# Patient Record
Sex: MALE | Race: OTHER | HISPANIC OR LATINO | Employment: FULL TIME | URBAN - METROPOLITAN AREA
[De-identification: names, ages, dates, MRNs, and addresses within clinical notes are randomized per-mention and may not be internally consistent; named-entity substitution may affect disease eponyms.]

---

## 2018-01-15 ENCOUNTER — OFFICE VISIT CONVERTED (OUTPATIENT)
Dept: SURGERY | Facility: CLINIC | Age: 43
End: 2018-01-15
Attending: UROLOGY

## 2021-02-24 ENCOUNTER — HOSPITAL ENCOUNTER (OUTPATIENT)
Dept: PERIOP | Facility: HOSPITAL | Age: 46
Setting detail: HOSPITAL OUTPATIENT SURGERY
Discharge: HOME | End: 2021-02-24
Attending: SURGERY

## 2021-02-24 LAB
ALBUMIN SERPL-MCNC: 4.3 G/DL (ref 3.5–5)
ALBUMIN/GLOB SERPL: 1.2 {RATIO} (ref 1.4–2.6)
ALP SERPL-CCNC: 98 U/L (ref 53–128)
ALT SERPL-CCNC: 36 U/L (ref 10–40)
ANION GAP SERPL CALC-SCNC: 15 MMOL/L (ref 8–19)
APPEARANCE UR: CLEAR
AST SERPL-CCNC: 23 U/L (ref 15–50)
BASOPHILS # BLD AUTO: 0.06 10*3/UL (ref 0–0.2)
BASOPHILS NFR BLD AUTO: 0.8 % (ref 0–3)
BILIRUB SERPL-MCNC: 0.36 MG/DL (ref 0.2–1.3)
BILIRUB UR QL: NEGATIVE
BUN SERPL-MCNC: 17 MG/DL (ref 5–25)
BUN/CREAT SERPL: 14 {RATIO} (ref 6–20)
CALCIUM SERPL-MCNC: 9.5 MG/DL (ref 8.7–10.4)
CHLORIDE SERPL-SCNC: 102 MMOL/L (ref 99–111)
COLOR UR: YELLOW
CONV ABS IMM GRAN: 0.01 10*3/UL (ref 0–0.2)
CONV BACTERIA: NEGATIVE
CONV CO2: 25 MMOL/L (ref 22–32)
CONV COLLECTION SOURCE (UA): ABNORMAL
CONV HYALINE CASTS IN URINE MICRO: ABNORMAL /[LPF]
CONV IMMATURE GRAN: 0.1 % (ref 0–1.8)
CONV TOTAL PROTEIN: 7.8 G/DL (ref 6.3–8.2)
CONV UROBILINOGEN IN URINE BY AUTOMATED TEST STRIP: 1 {EHRLICHU}/DL (ref 0.1–1)
CREAT UR-MCNC: 1.19 MG/DL (ref 0.7–1.2)
DEPRECATED RDW RBC AUTO: 39.9 FL (ref 35.1–43.9)
EOSINOPHIL # BLD AUTO: 0.09 10*3/UL (ref 0–0.7)
EOSINOPHIL # BLD AUTO: 1.3 % (ref 0–7)
ERYTHROCYTE [DISTWIDTH] IN BLOOD BY AUTOMATED COUNT: 12.7 % (ref 11.6–14.4)
GFR SERPLBLD BASED ON 1.73 SQ M-ARVRAT: >60 ML/MIN/{1.73_M2}
GLOBULIN UR ELPH-MCNC: 3.5 G/DL (ref 2–3.5)
GLUCOSE SERPL-MCNC: 106 MG/DL (ref 70–99)
GLUCOSE UR QL: NEGATIVE MG/DL
HCT VFR BLD AUTO: 47.3 % (ref 42–52)
HGB BLD-MCNC: 15.8 G/DL (ref 14–18)
HGB UR QL STRIP: NEGATIVE
KETONES UR QL STRIP: NEGATIVE MG/DL
LEUKOCYTE ESTERASE UR QL STRIP: ABNORMAL
LIPASE SERPL-CCNC: 91 U/L (ref 5–51)
LYMPHOCYTES # BLD AUTO: 2.56 10*3/UL (ref 1–5)
LYMPHOCYTES NFR BLD AUTO: 35.6 % (ref 20–45)
MCH RBC QN AUTO: 28.8 PG (ref 27–31)
MCHC RBC AUTO-ENTMCNC: 33.4 G/DL (ref 33–37)
MCV RBC AUTO: 86.3 FL (ref 80–96)
MONOCYTES # BLD AUTO: 0.78 10*3/UL (ref 0.2–1.2)
MONOCYTES NFR BLD AUTO: 10.8 % (ref 3–10)
NEUTROPHILS # BLD AUTO: 3.7 10*3/UL (ref 2–8)
NEUTROPHILS NFR BLD AUTO: 51.4 % (ref 30–85)
NITRITE UR QL STRIP: NEGATIVE
NRBC CBCN: 0 % (ref 0–0.7)
OSMOLALITY SERPL CALC.SUM OF ELEC: 288 MOSM/KG (ref 273–304)
PH UR STRIP.AUTO: 6 [PH] (ref 5–8)
PLATELET # BLD AUTO: 246 10*3/UL (ref 130–400)
PMV BLD AUTO: 10.8 FL (ref 9.4–12.4)
POTASSIUM SERPL-SCNC: 3.9 MMOL/L (ref 3.5–5.3)
PROT UR QL: NEGATIVE MG/DL
RBC # BLD AUTO: 5.48 10*6/UL (ref 4.7–6.1)
RBC #/AREA URNS HPF: ABNORMAL /[HPF]
SODIUM SERPL-SCNC: 138 MMOL/L (ref 135–147)
SP GR UR: 1.02 (ref 1–1.03)
WBC # BLD AUTO: 7.2 10*3/UL (ref 4.8–10.8)
WBC #/AREA URNS HPF: ABNORMAL /[HPF]

## 2021-02-26 LAB — BACTERIA UR CULT: NORMAL

## 2021-03-22 ENCOUNTER — OFFICE VISIT CONVERTED (OUTPATIENT)
Dept: SURGERY | Facility: CLINIC | Age: 46
End: 2021-03-22
Attending: SURGERY

## 2021-05-14 VITALS — RESPIRATION RATE: 14 BRPM | WEIGHT: 200 LBS | BODY MASS INDEX: 33.32 KG/M2 | HEIGHT: 65 IN

## 2021-05-14 NOTE — PROGRESS NOTES
"   Progress Note      Patient Name: Isidoro Estrella   Patient ID: [id number]   Sex: Male   YOB: 1975    Primary Care Provider: No PCP No PCP Other    Visit Date: March 22, 2021    Provider: Rusty Correa MD   Location: [facility]   Location Address: [address] [place] [address]  [id number]   Location Phone: [phone]          Chief Complaint  · Follow up Surgery      History Of Present Illness  Isidoro Estrella is a 45 year old Other Race,  or  male who presents today for a postoperative visit.      Patient is here for follow-up after laparoscopic appendectomy.  He is doing fine.  No new issues.  Abdominal exam is benign.  Patient can see me as needed       Vitals  Date Time BP Position Site L\R Cuff Size HR RR TEMP (F) WT  HT  BMI kg/m2 BSA m2 O2 Sat FR L/min FiO2 HC       03/22/2021 01:46 PM       14  200lbs 0oz 5'  5\" 33.28 2.04                 Assessment  · Postoperative Exam Following Surgery     V67.00      Plan  · Medications  o Medications have been Reconciled  o Transition of Care or Provider Policy  · Instructions  o See Above HPI section.  o Electronically Identified Patient Education Materials Provided Electronically            Electronically Signed by: Rusty Correa MD -Author on March 22, 2021 01:49:55 PM  "

## 2021-05-16 VITALS
WEIGHT: 180 LBS | SYSTOLIC BLOOD PRESSURE: 122 MMHG | DIASTOLIC BLOOD PRESSURE: 82 MMHG | HEIGHT: 65 IN | BODY MASS INDEX: 29.99 KG/M2

## 2021-07-06 ENCOUNTER — OFFICE VISIT (OUTPATIENT)
Dept: FAMILY MEDICINE CLINIC | Facility: CLINIC | Age: 46
End: 2021-07-06

## 2021-07-06 ENCOUNTER — HOSPITAL ENCOUNTER (OUTPATIENT)
Dept: GENERAL RADIOLOGY | Facility: HOSPITAL | Age: 46
Discharge: HOME | End: 2021-07-06
Admitting: NURSE PRACTITIONER

## 2021-07-06 VITALS
BODY MASS INDEX: 34.02 KG/M2 | SYSTOLIC BLOOD PRESSURE: 132 MMHG | OXYGEN SATURATION: 95 % | WEIGHT: 204.2 LBS | HEART RATE: 75 BPM | HEIGHT: 65 IN | DIASTOLIC BLOOD PRESSURE: 90 MMHG | RESPIRATION RATE: 18 BRPM

## 2021-07-06 DIAGNOSIS — R03.0 ELEVATED BLOOD PRESSURE READING: ICD-10-CM

## 2021-07-06 DIAGNOSIS — R31.9 HEMATURIA, UNSPECIFIED TYPE: ICD-10-CM

## 2021-07-06 DIAGNOSIS — Z87.442 HISTORY OF KIDNEY STONES: ICD-10-CM

## 2021-07-06 DIAGNOSIS — M54.50 RIGHT-SIDED LOW BACK PAIN WITHOUT SCIATICA, UNSPECIFIED CHRONICITY: ICD-10-CM

## 2021-07-06 DIAGNOSIS — M54.50 ACUTE RIGHT-SIDED LOW BACK PAIN, UNSPECIFIED WHETHER SCIATICA PRESENT: Primary | ICD-10-CM

## 2021-07-06 LAB
BILIRUB BLD-MCNC: NEGATIVE MG/DL
CLARITY, POC: CLEAR
COLOR UR: YELLOW
GLUCOSE UR STRIP-MCNC: NEGATIVE MG/DL
KETONES UR QL: NEGATIVE
LEUKOCYTE EST, POC: NEGATIVE
NITRITE UR-MCNC: NEGATIVE MG/ML
PH UR: 5.5 [PH] (ref 5–8)
PROT UR STRIP-MCNC: NEGATIVE MG/DL
RBC # UR STRIP: ABNORMAL /UL
SP GR UR: 1.03 (ref 1–1.03)
UROBILINOGEN UR QL: NORMAL

## 2021-07-06 PROCEDURE — 74018 RADEX ABDOMEN 1 VIEW: CPT

## 2021-07-06 PROCEDURE — 81003 URINALYSIS AUTO W/O SCOPE: CPT | Performed by: NURSE PRACTITIONER

## 2021-07-06 PROCEDURE — 99213 OFFICE O/P EST LOW 20 MIN: CPT | Performed by: NURSE PRACTITIONER

## 2021-07-06 PROCEDURE — 74018 RADEX ABDOMEN 1 VIEW: CPT | Performed by: RADIOLOGY

## 2021-07-06 PROCEDURE — 87086 URINE CULTURE/COLONY COUNT: CPT | Performed by: NURSE PRACTITIONER

## 2021-07-06 NOTE — PROGRESS NOTES
"Chief Complaint  Establish Care, right side low back pain    Subjective          Isidoro Estrella presents to [facility] for   History of Present Illness    Patient is here to establish care. Patient does not have a previous PCP.    Patient has some complaints of a possible kidney stone, patient is having lower back pain that does not radiate. Patient denies any blood in urine or dysuria. states this has been going on for approx 1 year.  States has worsened , any movement the wrong way causes sharp stabbing pain in the location.     Pt has hx of kidney stones, has had to have surgery 2 separate times. States last time was in 2019.     Patient would like info on getting a vasectomy. Pt has children, youngest is 14 years. States he feels like he is definitely done with having kids, states he Is .     Medical History  Past Medical History:   Diagnosis Date   • Allergic    • Gallstones    • Kidney stone      Surgical History  Past Surgical History:   Procedure Laterality Date   • APPENDECTOMY     • KIDNEY STONE SURGERY       Social History  Social History     Socioeconomic History   • Marital status:      Spouse name: Not on file   • Number of children: Not on file   • Years of education: Not on file   • Highest education level: Not on file   Tobacco Use   • Smoking status: Never Smoker   • Smokeless tobacco: Never Used   Vaping Use   • Vaping Use: Never used   Substance and Sexual Activity   • Alcohol use: Never   • Drug use: Never   • Sexual activity: Defer     No current outpatient medications on file.    Review of Systems     Objective     /90 (BP Location: Left arm, Patient Position: Sitting, Cuff Size: Adult)   Pulse 75   Resp 18   Ht 165.1 cm (65\")   Wt 92.6 kg (204 lb 3.2 oz)   SpO2 95%   BMI 33.98 kg/m²     Body mass index is 33.98 kg/m².    Physical Exam  Vitals reviewed.   Constitutional:       Appearance: Normal appearance. He is well-developed. "   HENT:      Head: Normocephalic and atraumatic.      Right Ear: External ear normal.      Left Ear: External ear normal.      Mouth/Throat:      Pharynx: No oropharyngeal exudate.   Eyes:      Conjunctiva/sclera: Conjunctivae normal.      Pupils: Pupils are equal, round, and reactive to light.   Cardiovascular:      Rate and Rhythm: Normal rate and regular rhythm.      Heart sounds: No murmur heard.   No friction rub. No gallop.    Pulmonary:      Effort: Pulmonary effort is normal.      Breath sounds: Normal breath sounds. No wheezing or rhonchi.   Abdominal:      Palpations: Abdomen is soft.      Tenderness: There is no abdominal tenderness. There is no right CVA tenderness or left CVA tenderness.   Musculoskeletal:      Lumbar back: Tenderness present. No deformity or bony tenderness. Decreased range of motion. Positive right straight leg raise test. Negative left straight leg raise test.      Comments: Very mildly positive right-sided straight leg raise, patient tender to palpation right side low back.Patient unable to bend over due to pain.   Skin:     General: Skin is warm and dry.   Neurological:      Mental Status: He is alert and oriented to person, place, and time.      Cranial Nerves: No cranial nerve deficit.   Psychiatric:         Mood and Affect: Mood and affect normal.         Behavior: Behavior normal.         Thought Content: Thought content normal.         Judgment: Judgment normal.         Result Review :     The following data was reviewed by: TEJ Solitario on 07/06/2021:    UA    Urinalysis 2/24/21 7/6/21   Specific [place], UA 1.021    Ketones, UA NEGATIVE Negative   Blood, UA NEGATIVE    Leukocytes, UA MODERATE (A) Negative   Nitrite, UA NEGATIVE    RBC, UA NONE SEEN    (A) Abnormal value       Comments are available for some flowsheets but are not being displayed.           UA today positive for Trace BLOOD                      Assessment:  Diagnoses and all orders for this  visit:    1. Acute right-sided low back pain, unspecified whether sciatica present (Primary)  -     POCT urinalysis dipstick, automated    2. Right-sided low back pain without sciatica, unspecified chronicity  -     XR Abdomen KUB; Future    3. Hematuria, unspecified type  -     XR Abdomen KUB; Future    4. History of kidney stones  -     XR Abdomen KUB; Future    5. Elevated blood pressure reading        Plan: Patient to monitor blood pressure and follow-up if above 130/80.  Will obtain KUB today and expect to possibly order CT abdomen and pelvis depending on results.  Will make referral to urology for both vasectomy discussion and kidney stone if one is noted.    Follow Up     No follow-ups on file.    Patient was given instructions and counseling regarding his condition or for health maintenance advice. Please see specific information pulled into the AVS if appropriate.     Christy Guerrero, APRN  07/06/2021

## 2021-07-07 ENCOUNTER — TELEPHONE (OUTPATIENT)
Dept: FAMILY MEDICINE CLINIC | Facility: CLINIC | Age: 46
End: 2021-07-07

## 2021-07-07 DIAGNOSIS — R31.9 HEMATURIA, UNSPECIFIED TYPE: Primary | ICD-10-CM

## 2021-07-07 LAB — BACTERIA SPEC AEROBE CULT: NO GROWTH

## 2021-07-16 ENCOUNTER — HOSPITAL ENCOUNTER (OUTPATIENT)
Dept: CT IMAGING | Facility: HOSPITAL | Age: 46
Discharge: HOME | End: 2021-07-16
Admitting: NURSE PRACTITIONER

## 2021-07-16 DIAGNOSIS — R31.9 HEMATURIA, UNSPECIFIED TYPE: ICD-10-CM

## 2021-07-16 PROCEDURE — 74176 CT ABD & PELVIS W/O CONTRAST: CPT

## 2021-07-20 ENCOUNTER — TELEPHONE (OUTPATIENT)
Dept: FAMILY MEDICINE CLINIC | Facility: CLINIC | Age: 46
End: 2021-07-20

## 2021-07-20 DIAGNOSIS — N17.2: Primary | ICD-10-CM

## 2022-05-16 ENCOUNTER — TELEPHONE (OUTPATIENT)
Dept: INTERNAL MEDICINE | Facility: CLINIC | Age: 47
End: 2022-05-16

## 2022-05-16 NOTE — TELEPHONE ENCOUNTER
Caller: Isidoro Estrella    Relationship to patient: Self    Best call back number: [phone]    Additional notes: PATIENT NEEDS PAPER PROOF FOR HIS JOB THAT HE HAS AN APPOINTMENT SCHEDULED ON 6/27/22 WITH DR CARTER. PLEASE CALL PATIENT TO LET HIM KNOW IF SOMETHING CAN BE PRINTED OUT.

## 2022-06-24 NOTE — PATIENT INSTRUCTIONS
Cooking With Less Salt  Cooking with less salt is one way to reduce the amount of sodium you get from food. Sodium is one of the elements that make up salt. It is found naturally in foods and is also added to certain foods. Depending on your condition and overall health, your health care provider or dietitian may recommend that you reduce your sodium intake. Most people should have less than 2,300 milligrams (mg) of sodium each day. If you have high blood pressure (hypertension), you may need to limit your sodium to 1,500 mg each day. Follow the tips below to help reduce your sodium intake.  What are tips for eating less sodium?  Reading food labels       Check the food label before buying or using packaged ingredients. Always check the label for the serving size and sodium content.  Look for products with no more than 140 mg of sodium in one serving.  Check the % Daily Value column to see what percent of the daily recommended amount of sodium is provided in one serving of the product. Foods with 5% or less in this column are considered low in sodium. Foods with 20% or higher are considered high in sodium.  Do not choose foods with salt as one of the first three ingredients on the ingredients list. If salt is one of the first three ingredients, it usually means the item is high in sodium.     Shopping  Buy sodium-free or low-sodium products. Look for the following words on food labels:  Low-sodium.  Sodium-free.  Reduced-sodium.  No salt added.  Unsalted.  Always check the sodium content even if foods are labeled as low-sodium or no salt added.  Buy fresh foods.  Cooking  Use herbs, seasonings without salt, and spices as substitutes for salt.  Use sodium-free baking soda when baking.  Cloquet, braise, or roast foods to add flavor with less salt.  Avoid adding salt to pasta, rice, or hot cereals.  Drain and rinse canned vegetables, beans, and meat before use.  Avoid adding salt when cooking sweets and desserts.  Cook  "with low-sodium ingredients.  What foods are high in sodium?  Vegetables  Regular canned vegetables (not low-sodium or reduced-sodium). Sauerkraut, pickled vegetables, and relishes. Olives. French fries. Onion rings. Regular canned tomato sauce and paste. Regular tomato and vegetable juice. Frozen vegetables in sauces.  Grains  Instant hot cereals. Bread stuffing, pancake, and biscuit mixes. Croutons. Seasoned rice or pasta mixes. Noodle soup cups. Boxed or frozen macaroni and cheese. Regular salted crackers. Self-rising flour. Rolls. Bagels. Flour tortillas and wraps.  Meats and other proteins  Meat or fish that is salted, canned, smoked, cured, spiced, or pickled. This includes camacho, ham, sausages, hot dogs, corned beef, chipped beef, meat loaves, salt pork, jerky, pickled herring, anchovies, regular canned tuna, and sardines. Salted nuts.  Dairy  Processed cheese and cheese spreads. Cheese curds. Blue cheese. Feta cheese. String cheese. Regular cottage cheese. Buttermilk. Canned milk.  The items listed above may not be a complete list of foods high in sodium. Actual amounts of sodium may be different depending on processing. Contact a dietitian for more information.  What foods are low in sodium?  Fruits  Fresh, frozen, or canned fruit with no sauce added. Fruit juice.  Vegetables  Fresh or frozen vegetables with no sauce added. \"No salt added\" canned vegetables. \"No salt added\" tomato sauce and paste. Low-sodium or reduced-sodium tomato and vegetable juice.  Grains  Noodles, pasta, quinoa, rice. Shredded or puffed wheat or puffed rice. Regular or quick oats (not instant). Low-sodium crackers. Low-sodium bread. Whole-grain bread and whole-grain pasta. Unsalted popcorn.  Meats and other proteins  Fresh or frozen whole meats, poultry (not injected with sodium), and fish with no sauce added. Unsalted nuts. Dried peas, beans, and lentils without added salt. Unsalted canned beans. Eggs. Unsalted nut butters. " Low-sodium canned tuna or chicken.  Dairy  Milk. Soy milk. Yogurt. Low-sodium cheeses, such as Swiss, [place], mozzarella, and ricotta. Sherbet or ice cream (keep to ½ cup per serving). Cream cheese.  Fats and oils  Unsalted butter or margarine.  Other foods  Homemade pudding. Sodium-free baking soda and baking powder. Herbs and spices. Low-sodium seasoning mixes.  Beverages  Coffee and tea. Carbonated beverages.  The items listed above may not be a complete list of foods low in sodium. Actual amounts of sodium may be different depending on processing. Contact a dietitian for more information.  What are some salt alternatives when cooking?  The following are herbs, seasonings, and spices that can be used instead of salt to flavor your food. Herbs should be fresh or dried. Do not choose packaged mixes. Next to the name of the herb, spice, or seasoning are some examples of foods you can pair it with.  Herbs  Bay leaves - Soups, meat and vegetable dishes, and spaghetti sauce.  Basil - Italian dishes, soups, pasta, and fish dishes.  Cilantro - Meat, poultry, and vegetable dishes.  Chili powder - Marinades and Mexican dishes.  Chives - Salad dressings and potato dishes.  Cumin - Mexican dishes, couscous, and meat dishes.  Dill - Fish dishes, sauces, and salads.  Fennel - Meat and vegetable dishes, breads, and cookies.  Garlic (do not use garlic salt) - Italian dishes, meat dishes, salad dressings, and sauces.  Marjoram - Soups, potato dishes, and meat dishes.  Oregano - Pizza and spaghetti sauce.  Parsley - Salads, soups, pasta, and meat dishes.  Carina - Italian dishes, salad dressings, soups, and red meats.  Saffron - Fish dishes, pasta, and some poultry dishes.  Branden - Stuffings and sauces.  Tarragon - Fish and poultry dishes.  Thyme - Stuffing, meat, and fish dishes.  Seasonings  Lemon juice - Fish dishes, poultry dishes, vegetables, and salads.  Vinegar - Salad dressings, vegetables, and fish  "dishes.  Spices  Cinnamon - Sweet dishes, such as cakes, cookies, and puddings.  Cloves - Gingerbread, puddings, and marinades for meats.  Coelho - Vegetable dishes, fish and poultry dishes, and stir-miramontes dishes.  Joyce - Vegetable dishes, fish dishes, and stir-miramontes dishes.  Nutmeg - Pasta, vegetables, poultry, fish dishes, and custard.  Summary  Cooking with less salt is one way to reduce the amount of sodium that you get from food.  Buy sodium-free or low-sodium products.  Check the food label before using or buying packaged ingredients.  Use herbs, seasonings without salt, and spices as substitutes for salt in foods.  This information is not intended to replace advice given to you by your health care provider. Make sure you discuss any questions you have with your health care provider.  Document Revised: 12/09/2020 Document Reviewed: 12/09/2020  [employer] Patient Education © 2021 [employer]      https://www.nhlbi.nih.gov/files/docs/public/heart/dash_brief.pdf\">   DASH Eating Plan  DASH stands for Dietary Approaches to Stop Hypertension. The DASH eating plan is a healthy eating plan that has been shown to:  Reduce high blood pressure (hypertension).  Reduce your risk for type 2 diabetes, heart disease, and stroke.  Help with weight loss.  What are tips for following this plan?  Reading food labels  Check food labels for the amount of salt (sodium) per serving. Choose foods with less than 5 percent of the Daily Value of sodium. Generally, foods with less than 300 milligrams (mg) of sodium per serving fit into this eating plan.  To find whole grains, look for the word \"whole\" as the first word in the ingredient list.  Shopping  Buy products labeled as \"low-sodium\" or \"no salt added.\"  Buy fresh foods. Avoid canned foods and pre-made or frozen meals.  Cooking  Avoid adding salt when cooking. Use salt-free seasonings or herbs instead of table salt or sea salt. Check with your health care provider or pharmacist before " using salt substitutes.  Do not miramontes foods. Cook foods using healthy methods such as baking, boiling, grilling, roasting, and broiling instead.  Cook with heart-healthy oils, such as olive, canola, avocado, soybean, or sunflower oil.  Meal planning       Eat a balanced diet that includes:  4 or more servings of fruits and 4 or more servings of vegetables each day. Try to fill one-half of your plate with fruits and vegetables.  6-8 servings of whole grains each day.  Less than 6 oz (170 g) of lean meat, poultry, or fish each day. A 3-oz (85-g) serving of meat is about the same size as a deck of cards. One egg equals 1 oz (28 g).  2-3 servings of low-fat dairy each day. One serving is 1 cup (237 mL).  1 serving of nuts, seeds, or beans 5 times each week.  2-3 servings of heart-healthy fats. Healthy fats called omega-3 fatty acids are found in foods such as walnuts, flaxseeds, fortified milks, and eggs. These fats are also found in cold-water fish, such as sardines, salmon, and mackerel.  Limit how much you eat of:  Canned or prepackaged foods.  Food that is high in trans fat, such as some fried foods.  Food that is high in saturated fat, such as fatty meat.  Desserts and other sweets, sugary drinks, and other foods with added sugar.  Full-fat dairy products.  Do not salt foods before eating.  Do not eat more than 4 egg yolks a week.  Try to eat at least 2 vegetarian meals a week.  Eat more home-cooked food and less restaurant, buffet, and fast food.     Lifestyle  When eating at a restaurant, ask that your food be prepared with less salt or no salt, if possible.  If you drink alcohol:  Limit how much you use to:  0-1 drink a day for women who are not pregnant.  0-2 drinks a day for men.  Be aware of how much alcohol is in your drink. In the [place] one drink equals one 12 oz bottle of beer (355 mL), one 5 oz glass of wine (148 mL), or one 1½ oz glass of hard liquor (44 mL).  General information  Avoid eating more than  2,300 mg of salt a day. If you have hypertension, you may need to reduce your sodium intake to 1,500 mg a day.  Work with your health care provider to maintain a healthy body weight or to lose weight. Ask what an ideal weight is for you.  Get at least 30 minutes of exercise that causes your heart to beat faster (aerobic exercise) most days of the week. Activities may include walking, swimming, or biking.  Work with your health care provider or dietitian to adjust your eating plan to your individual calorie needs.  What foods should I eat?  Fruits  All fresh, dried, or frozen fruit. Canned fruit in natural juice (without added sugar).  Vegetables  Fresh or frozen vegetables (raw, steamed, roasted, or grilled). Low-sodium or reduced-sodium tomato and vegetable juice. Low-sodium or reduced-sodium tomato sauce and tomato paste. Low-sodium or reduced-sodium canned vegetables.  Grains  Whole-grain or whole-wheat bread. Whole-grain or whole-wheat pasta. Brown rice. Oatmeal. Quinoa. Bulgur. Whole-grain and low-sodium cereals. Malka bread. Low-fat, low-sodium crackers. Whole-wheat flour tortillas.  Meats and other proteins  Skinless chicken or turkey. Ground chicken or turkey. Pork with fat trimmed off. Fish and seafood. Egg whites. Dried beans, peas, or lentils. Unsalted nuts, nut butters, and seeds. Unsalted canned beans. Lean cuts of beef with fat trimmed off. Low-sodium, lean precooked or cured meat, such as sausages or meat loaves.  Dairy  Low-fat (1%) or fat-free (skim) milk. Reduced-fat, low-fat, or fat-free cheeses. Nonfat, low-sodium ricotta or cottage cheese. Low-fat or nonfat yogurt. Low-fat, low-sodium cheese.  Fats and oils  Soft margarine without trans fats. Vegetable oil. Reduced-fat, low-fat, or light mayonnaise and salad dressings (reduced-sodium). Canola, safflower, olive, avocado, soybean, and sunflower oils. Avocado.  Seasonings and condiments  Herbs. Spices. Seasoning mixes without salt.  Other  foods  Unsalted popcorn and pretzels. Fat-free sweets.  The items listed above may not be a complete list of foods and beverages you can eat. Contact a dietitian for more information.  What foods should I avoid?  Fruits  Canned fruit in a light or heavy syrup. Fried fruit. Fruit in cream or butter sauce.  Vegetables  Creamed or fried vegetables. Vegetables in a cheese sauce. Regular canned vegetables (not low-sodium or reduced-sodium). Regular canned tomato sauce and paste (not low-sodium or reduced-sodium). Regular tomato and vegetable juice (not low-sodium or reduced-sodium). Pickles. Olives.  Grains  Baked goods made with fat, such as croissants, muffins, or some breads. Dry pasta or rice meal packs.  Meats and other proteins  Fatty cuts of meat. Ribs. Fried meat. Dejesus. Bologna, salami, and other precooked or cured meats, such as sausages or meat loaves. Fat from the back of a pig (fatback). Bratwurst. Salted nuts and seeds. Canned beans with added salt. Canned or smoked fish. Whole eggs or egg yolks. Chicken or turkey with skin.  Dairy  Whole or 2% milk, cream, and half-and-half. Whole or full-fat cream cheese. Whole-fat or sweetened yogurt. Full-fat cheese. Nondairy creamers. Whipped toppings. Processed cheese and cheese spreads.  Fats and oils  Butter. Stick margarine. Lard. Shortening. Ghee. Dejesus fat. Tropical oils, such as coconut, palm kernel, or palm oil.  Seasonings and condiments  Onion salt, garlic salt, seasoned salt, table salt, and sea salt. Worcestershire sauce. Tartar sauce. Barbecue sauce. Teriyaki sauce. Soy sauce, including reduced-sodium. Steak sauce. Canned and packaged gravies. Fish sauce. Oyster sauce. Cocktail sauce. Store-bought horseradish. Ketchup. Mustard. Meat flavorings and tenderizers. Bouillon cubes. Hot sauces. Pre-made or packaged marinades. Pre-made or packaged taco seasonings. Relishes. Regular salad dressings.  Other foods  Salted popcorn and pretzels.  The items listed above  may not be a complete list of foods and beverages you should avoid. Contact a dietitian for more information.  Where to find more information  National Heart, Lung, and Blood [place]: www.nhlbi.nih.gov  American Heart Association: www.heart.org  Academy of Nutrition and Dietetics: www.eatright.org  National Kidney Foundation: www.kidney.org  Summary  The DASH eating plan is a healthy eating plan that has been shown to reduce high blood pressure (hypertension). It may also reduce your risk for type 2 diabetes, heart disease, and stroke.  When on the DASH eating plan, aim to eat more fresh fruits and vegetables, whole grains, lean proteins, low-fat dairy, and heart-healthy fats.  With the DASH eating plan, you should limit salt (sodium) intake to 2,300 mg a day. If you have hypertension, you may need to reduce your sodium intake to 1,500 mg a day.  Work with your health care provider or dietitian to adjust your eating plan to your individual calorie needs.  This information is not intended to replace advice given to you by your health care provider. Make sure you discuss any questions you have with your health care provider.  Document Revised: 11/20/2020 Document Reviewed: 11/20/2020  [employer] Patient Education © 2021 [employer].       Heart-Healthy Eating Plan  Heart-healthy meal planning includes:  Eating less unhealthy fats.  Eating more healthy fats.  Making other changes in your diet.  Talk with your doctor or a diet specialist (dietitian) to create an eating plan that is right for you.  What is my plan?  Your doctor may recommend an eating plan that includes:  Total fat: ______% or less of total calories a day.  Saturated fat: ______% or less of total calories a day.  Cholesterol: less than _________mg a day.  What are tips for following this plan?  Cooking  Avoid frying your food. Try to bake, boil, grill, or broil it instead. You can also reduce fat by:  Removing the skin from poultry.  Removing all  visible fats from meats.  Steaming vegetables in water or broth.  Meal planning       At meals, divide your plate into four equal parts:  Fill one-half of your plate with vegetables and green salads.  Fill one-fourth of your plate with whole grains.  Fill one-fourth of your plate with lean protein foods.  Eat 4-5 servings of vegetables per day. A serving of vegetables is:  1 cup of raw or cooked vegetables.  2 cups of raw leafy greens.  Eat 4-5 servings of fruit per day. A serving of fruit is:  1 medium whole fruit.  ¼ cup of dried fruit.  ½ cup of fresh, frozen, or canned fruit.  ½ cup of 100% fruit juice.  Eat more foods that have soluble fiber. These are apples, broccoli, carrots, beans, peas, and barley. Try to get 20-30 g of fiber per day.  Eat 4-5 servings of nuts, legumes, and seeds per week:  1 serving of dried beans or legumes equals ½ cup after being cooked.  1 serving of nuts is ¼ cup.  1 serving of seeds equals 1 tablespoon.     General information  Eat more home-cooked food. Eat less restaurant, buffet, and fast food.  Limit or avoid alcohol.  Limit foods that are high in starch and sugar.  Avoid fried foods.  Lose weight if you are overweight.  Keep track of how much salt (sodium) you eat. This is important if you have high blood pressure. Ask your doctor to tell you more about this.  Try to add vegetarian meals each week.  Fats  Choose healthy fats. These include olive oil and canola oil, flaxseeds, walnuts, almonds, and seeds.  Eat more omega-3 fats. These include salmon, mackerel, sardines, tuna, flaxseed oil, and ground flaxseeds. Try to eat fish at least 2 times each week.  Check food labels. Avoid foods with trans fats or high amounts of saturated fat.  Limit saturated fats.  These are often found in animal products, such as meats, butter, and cream.  These are also found in plant foods, such as palm oil, palm kernel oil, and coconut oil.  Avoid foods with partially hydrogenated oils in them.  These have trans fats. Examples are stick margarine, some tub margarines, cookies, crackers, and other baked goods.  What foods can I eat?  Fruits  All fresh, canned (in natural juice), or frozen fruits.  Vegetables  Fresh or frozen vegetables (raw, steamed, roasted, or grilled). Green salads.  Grains  Most grains. Choose whole wheat and whole grains most of the time. Rice and pasta, including brown rice and pastas made with whole wheat.  Meats and other proteins  Lean, well-trimmed beef, veal, pork, and lamb. Chicken and turkey without skin. All fish and shellfish. Wild duck, rabbit, pheasant, and venison. Egg whites or low-cholesterol egg substitutes. Dried beans, peas, lentils, and tofu. Seeds and most nuts.  Dairy  Low-fat or nonfat cheeses, including ricotta and mozzarella. Skim or 1% milk that is liquid, powdered, or evaporated. Buttermilk that is made with low-fat milk. Nonfat or low-fat yogurt.  Fats and oils  Non-hydrogenated (trans-free) margarines. Vegetable oils, including soybean, sesame, sunflower, olive, peanut, safflower, corn, canola, and cottonseed. Salad dressings or mayonnaise made with a vegetable oil.  Beverages  Mineral water. Coffee and tea. Diet carbonated beverages.  Sweets and desserts  Sherbet, gelatin, and fruit ice. Small amounts of dark chocolate.  Limit all sweets and desserts.  Seasonings and condiments  All seasonings and condiments.  The items listed above may not be a complete list of foods and drinks you can eat. Contact a dietitian for more options.  What foods should I avoid?  Fruits  Canned fruit in heavy syrup. Fruit in cream or butter sauce. Fried fruit. Limit coconut.  Vegetables  Vegetables cooked in cheese, cream, or butter sauce. Fried vegetables.  Grains  Breads that are made with saturated or trans fats, oils, or whole milk. Croissants. Sweet rolls. Donuts. High-fat crackers, such as cheese crackers.  Meats and other proteins  Fatty meats, such as hot dogs, ribs,  sausage, camacho, rib-eye roast or steak. High-fat deli meats, such as salami and bologna. Caviar. Domestic duck and goose. Organ meats, such as liver.  Dairy  Cream, sour cream, cream cheese, and creamed cottage cheese. Whole-milk cheeses. Whole or 2% milk that is liquid, evaporated, or condensed. Whole buttermilk. Cream sauce or high-fat cheese sauce. Yogurt that is made from whole milk.  Fats and oils  Meat fat, or shortening. Cocoa butter, hydrogenated oils, palm oil, coconut oil, palm kernel oil. Solid fats and shortenings, including camacho fat, salt pork, lard, and butter. Nondairy cream substitutes. Salad dressings with cheese or sour cream.  Beverages  Regular sodas and juice drinks with added sugar.  Sweets and desserts  Frosting. Pudding. Cookies. Cakes. Pies. Milk chocolate or white chocolate. Buttered syrups. Full-fat ice cream or ice cream drinks.  The items listed above may not be a complete list of foods and drinks to avoid. Contact a dietitian for more information.  Summary  Heart-healthy meal planning includes eating less unhealthy fats, eating more healthy fats, and making other changes in your diet.  Eat a balanced diet. This includes fruits and vegetables, low-fat or nonfat dairy, lean protein, nuts and legumes, whole grains, and heart-healthy oils and fats.  This information is not intended to replace advice given to you by your health care provider. Make sure you discuss any questions you have with your health care provider.  Document Revised: 02/21/2019 Document Reviewed: 01/25/2019  [employer]vier Patient Education © 2021 [employer].       Mediterranean Diet  A Mediterranean diet refers to food and lifestyle choices that are based on the traditions of countries located on the [place]. This way of eating has been shown to help prevent certain conditions and improve outcomes for people who have chronic diseases, like kidney disease and heart disease.  What are tips for following this  plan?  Lifestyle  Cook and eat meals together with your family, when possible.  Drink enough fluid to keep your urine clear or pale yellow.  Be physically active every day. This includes:  Aerobic exercise like running or swimming.  Leisure activities like gardening, walking, or housework.  Get 7-8 hours of sleep each night.  If recommended by your health care provider, drink red wine in moderation. This means 1 glass a day for nonpregnant women and 2 glasses a day for men. A glass of wine equals 5 oz (150 mL).  Reading food labels       Check the serving size of packaged foods. For foods such as rice and pasta, the serving size refers to the amount of cooked product, not dry.  Check the total fat in packaged foods. Avoid foods that have saturated fat or trans fats.  Check the ingredients list for added sugars, such as corn syrup.     Shopping  At the grocery store, buy most of your food from the areas near the walls of the store. This includes:  Fresh fruits and vegetables (produce).  Grains, beans, nuts, and seeds. Some of these may be available in unpackaged forms or large amounts (in bulk).  Fresh seafood.  Poultry and eggs.  Low-fat dairy products.  Buy whole ingredients instead of prepackaged foods.  Buy fresh fruits and vegetables in-season from local farmers markets.  Buy frozen fruits and vegetables in resealable bags.  If you do not have access to quality fresh seafood, buy precooked frozen shrimp or canned fish, such as tuna, salmon, or sardines.  Buy small amounts of raw or cooked vegetables, salads, or olives from the deli or salad bar at your store.  Stock your pantry so you always have certain foods on hand, such as olive oil, canned tuna, canned tomatoes, rice, pasta, and beans.  Cooking  Cook foods with extra-virgin olive oil instead of using butter or other vegetable oils.  Have meat as a side dish, and have vegetables or grains as your main dish. This means having meat in small portions or adding  small amounts of meat to foods like pasta or stew.  Use beans or vegetables instead of meat in common dishes like chili or lasagna.  [place] with different cooking methods. Try roasting or broiling vegetables instead of steaming or sautéeing them.  Add frozen vegetables to soups, stews, pasta, or rice.  Add nuts or seeds for added healthy fat at each meal. You can add these to yogurt, salads, or vegetable dishes.  Marinate fish or vegetables using olive oil, lemon juice, garlic, and fresh herbs.  Meal planning       Plan to eat 1 vegetarian meal one day each week. Try to work up to 2 vegetarian meals, if possible.  Eat seafood 2 or more times a week.  Have healthy snacks readily available, such as:  Vegetable sticks with hummus.  Greek yogurt.  Fruit and nut trail mix.  Eat balanced meals throughout the week. This includes:  Fruit: 2-3 servings a day  Vegetables: 4-5 servings a day  Low-fat dairy: 2 servings a day  Fish, poultry, or lean meat: 1 serving a day  Beans and legumes: 2 or more servings a week  Nuts and seeds: 1-2 servings a day  Whole grains: 6-8 servings a day  Extra-virgin olive oil: 3-4 servings a day  Limit red meat and sweets to only a few servings a month     What are my food choices?  Mediterranean diet  Recommended  Grains: Whole-grain pasta. Brown rice. Bulgar wheat. Polenta. Couscous. Whole-wheat bread. Oatmeal. Quinoa.  Vegetables: Artichokes. Beets. Broccoli. Cabbage. Carrots. Eggplant. Green beans. Chard. Kale. Spinach. Onions. Leeks. Peas. Squash. Tomatoes. Peppers. Radishes.  Fruits: Apples. Apricots. Avocado. Berries. Bananas. Cherries. Dates. Figs. Grapes. Vasile. Melon. Oranges. Peaches. Plums. Pomegranate.  Meats and other protein foods: Beans. Almonds. Sunflower seeds. Pine nuts. Peanuts. Cod. [place]. Scallops. Shrimp. Tuna. Tilapia. Clams. Oysters. Eggs.  Dairy: Low-fat milk. Cheese. Greek yogurt.  Beverages: Water. Red wine. Herbal tea.  Fats and oils: Extra virgin olive oil.  Avocado oil. Grape seed oil.  Sweets and desserts: Greek yogurt with honey. Baked apples. Poached pears. Trail mix.  Seasoning and other foods: Basil. Cilantro. Coriander. Cumin. Mint. Parsley. Branden. Rosemary. Tarragon. Garlic. Oregano. Thyme. Pepper. Balsalmic vinegar. Tahini. Hummus. Tomato sauce. Olives. Mushrooms.  Limit these  Grains: Prepackaged pasta or rice dishes. Prepackaged cereal with added sugar.  Vegetables: Deep fried potatoes (french fries).  Fruits: Fruit canned in syrup.  Meats and other protein foods: Beef. Pork. Lamb. Poultry with skin. Hot dogs. Dejesus.  Dairy: Ice cream. Sour cream. Whole milk.  Beverages: Juice. Sugar-sweetened soft drinks. Beer. Liquor and spirits.  Fats and oils: Butter. Canola oil. Vegetable oil. Beef fat (tallow). Lard.  Sweets and desserts: Cookies. Cakes. Pies. Candy.  Seasoning and other foods: Mayonnaise. Premade sauces and marinades.  The items listed may not be a complete list. Talk with your dietitian about what dietary choices are right for you.  Summary  The Mediterranean diet includes both food and lifestyle choices.  Eat a variety of fresh fruits and vegetables, beans, nuts, seeds, and whole grains.  Limit the amount of red meat and sweets that you eat.  Talk with your health care provider about whether it is safe for you to drink red wine in moderation. This means 1 glass a day for nonpregnant women and 2 glasses a day for men. A glass of wine equals 5 oz (150 mL).  This information is not intended to replace advice given to you by your health care provider. Make sure you discuss any questions you have with your health care provider.  Document Revised: 08/17/2017 Document Reviewed: 08/10/2017  Elsevier Patient Education © 2020 Elsevier Inc.         Exercising to Stay Healthy  To become healthy and stay healthy, it is recommended that you do moderate-intensity and vigorous-intensity exercise. You can tell that you are exercising at a moderate intensity if your  heart starts beating faster and you start breathing faster but can still hold a conversation. You can tell that you are exercising at a vigorous intensity if you are breathing much harder and faster and cannot hold a conversation while exercising.  Exercising regularly is important. It has many health benefits, such as:  Improving overall fitness, flexibility, and endurance.  Increasing bone density.  Helping with weight control.  Decreasing body fat.  Increasing muscle strength.  Reducing stress and tension.  Improving overall health.  How often should I exercise?  Choose an activity that you enjoy, and set realistic goals. Your health care provider can help you make an activity plan that works for you.  Exercise regularly as told by your health care provider. This may include:  Doing strength training two times a week, such as:  Lifting weights.  Using resistance bands.  Push-ups.  Sit-ups.  Yoga.  Doing a certain intensity of exercise for a given amount of time. Choose from these options:  A total of 150 minutes of moderate-intensity exercise every week.  A total of 75 minutes of vigorous-intensity exercise every week.  A mix of moderate-intensity and vigorous-intensity exercise every week.  Children, pregnant women, people who have not exercised regularly, people who are overweight, and older adults may need to talk with a health care provider about what activities are safe to do. If you have a medical condition, be sure to talk with your health care provider before you start a new exercise program.  What are some exercise ideas?    Moderate-intensity exercise ideas include:  Walking 1 mile (1.6 km) in about 15 minutes.  Biking.  Hiking.  Golfing.  Dancing.  Water aerobics.  Vigorous-intensity exercise ideas include:  Walking 4.5 miles (7.2 km) or more in about 1 hour.  Jogging or running 5 miles (8 km) in about 1 hour.  Biking 10 miles (16.1 km) or more in about 1 hour.  Lap swimming.  Roller-skating or in-line  skating.  Cross-country skiing.  Vigorous competitive sports, such as football, basketball, and soccer.  Jumping rope.  Aerobic dancing.  What are some everyday activities that can help me to get exercise?  Yard work, such as:  Pushing a .  Raking and bagging leaves.  Washing your car.  Pushing a stroller.  Shoveling snow.  Gardening.  Washing windows or floors.  How can I be more active in my day-to-day activities?  Use stairs instead of an elevator.  Take a walk during your lunch break.  If you drive, park your car farther away from your work or school.  If you take public transportation, get off one stop early and walk the rest of the way.  Stand up or walk around during all of your indoor phone calls.  Get up, stretch, and walk around every 30 minutes throughout the day.  Enjoy exercise with a friend. Support to continue exercising will help you keep a regular routine of activity.  What guidelines can I follow while exercising?  Before you start a new exercise program, talk with your health care provider.  Do not exercise so much that you hurt yourself, feel dizzy, or get very short of breath.  Wear comfortable clothes and wear shoes with good support.  Drink plenty of water while you exercise to prevent dehydration or heat stroke.  Work out until your breathing and your heartbeat get faster.  Where to find more information  [place] Department of Health and Human Services: www.hhs.gov  Centers for Disease Control and Prevention (CDC): www.cdc.gov  Summary  Exercising regularly is important. It will improve your overall fitness, flexibility, and endurance.  Regular exercise also will improve your overall health. It can help you control your weight, reduce stress, and improve your bone density.  Do not exercise so much that you hurt yourself, feel dizzy, or get very short of breath.  Before you start a new exercise program, talk with your health care provider.  This information is not intended to replace  advice given to you by your health care provider. Make sure you discuss any questions you have with your health care provider.  Document Revised: 11/30/2018 Document Reviewed: 11/08/2018  Elsevier Patient Education © 2021 [employer]           Mindfulness-Based Stress Reduction  Mindfulness-based stress reduction (MBSR) is a program that helps people learn to practice mindfulness. Mindfulness is the practice of intentionally paying attention to the present moment. It can be learned and practiced through techniques such as education, breathing exercises, meditation, and yoga. MBSR includes several mindfulness techniques in one program.  MBSR works best when you understand the treatment, are willing to try new things, and can commit to spending time practicing what you learn. MBSR training may include learning about:  How your emotions, thoughts, and reactions affect your body.  New ways to respond to things that cause negative thoughts to start (triggers).  How to notice your thoughts and let go of them.  Practicing awareness of everyday things that you normally do without thinking.  The techniques and goals of different types of meditation.  What are the benefits of MBSR?  MBSR can have many benefits, which include helping you to:  Develop self-awareness. This refers to knowing and understanding yourself.  Learn skills and attitudes that help you to participate in your own health care.  Learn new ways to care for yourself.  Be more accepting about how things are, and let things go.  Be less judgmental and approach things with an open mind.  Be patient with yourself and trust yourself more.  MBSR has also been shown to:  Reduce negative emotions, such as depression and anxiety.  Improve memory and focus.  Change how you sense and approach pain.  Boost your body's ability to fight infections.  Help you connect better with other people.  Improve your sense of well-being.  Follow these instructions at home:       Find  a local in-person or online MBSR program.  Set aside some time regularly for mindfulness practice.  Find a mindfulness practice that works best for you. This may include one or more of the following:  Meditation. Meditation involves focusing your mind on a certain thought or activity.  Breathing awareness exercises. These help you to stay present by focusing on your breath.  Body scan. For this practice, you lie down and pay attention to each part of your body from head to toe. You can identify tension and soreness and intentionally relax parts of your body.  Yoga. Yoga involves stretching and breathing, and it can improve your ability to move and be flexible. It can also provide an experience of testing your body's limits, which can help you release stress.  Mindful eating. This way of eating involves focusing on the taste, texture, color, and smell of each bite of food. Because this slows down eating and helps you feel full sooner, it can be an important part of a weight-loss plan.  Find a podcast or recording that provides guidance for breathing awareness, body scan, or meditation exercises. You can listen to these any time when you have a free moment to rest without distractions.  Follow your treatment plan as told by your health care provider. This may include taking regular medicines and making changes to your diet or lifestyle as recommended.  How to practice mindfulness  To do a basic awareness exercise:  Find a comfortable place to sit.  Pay attention to the present moment. Observe your thoughts, feelings, and surroundings just as they are.  Avoid placing judgment on yourself, your feelings, or your surroundings. Make note of any judgment that comes up, and let it go.  Your mind may wander, and that is okay. Make note of when your thoughts drift, and return your attention to the present moment.  To do basic mindfulness meditation:  Find a comfortable place to sit. This may include a stable chair or a firm  floor cushion.  Sit upright with your back straight. Let your arms fall next to your side with your hands resting on your legs.  If sitting in a chair, rest your feet flat on the floor.  If sitting on a cushion, cross your legs in front of you.  Keep your head in a neutral position with your chin dropped slightly. Relax your jaw and rest the tip of your tongue on the roof of your mouth. Drop your gaze to the floor. You can close your eyes if you like.  Breathe normally and pay attention to your breath. Feel the air moving in and out of your nose. Feel your belly expanding and relaxing with each breath.  Your mind may wander, and that is okay. Make note of when your thoughts drift, and return your attention to your breath.  Avoid placing judgment on yourself, your feelings, or your surroundings. Make note of any judgment or feelings that come up, let them go, and bring your attention back to your breath.  When you are ready, lift your gaze or open your eyes. Pay attention to how your body feels after the meditation.  Where to find more information  You can find more information about MBSR from:  Your health care provider.  Community-based meditation centers or programs.  Programs offered near you.  Summary  Mindfulness-based stress reduction (MBSR) is a program that teaches you how to intentionally pay attention to the present moment. It is used with other treatments to help you cope better with daily stress, emotions, and pain.  MBSR focuses on developing self-awareness, which allows you to respond to life stress without judgment or negative emotions.  MBSR programs may involve learning different mindfulness practices, such as breathing exercises, meditation, yoga, body scan, or mindful eating. Find a mindfulness practice that works best for you, and set aside time for it on a regular basis.  This information is not intended to replace advice given to you by your health care provider. Make sure you discuss any  questions you have with your health care provider.  Document Revised: 02/22/2021 Document Reviewed: 04/26/2018  Elsevier Patient Education © 2021 Elsevier Inc.

## 2022-06-27 ENCOUNTER — OFFICE VISIT (OUTPATIENT)
Dept: INTERNAL MEDICINE | Facility: CLINIC | Age: 47
End: 2022-06-27

## 2022-06-27 VITALS
HEART RATE: 68 BPM | SYSTOLIC BLOOD PRESSURE: 146 MMHG | OXYGEN SATURATION: 98 % | DIASTOLIC BLOOD PRESSURE: 98 MMHG | WEIGHT: 201.6 LBS | TEMPERATURE: 97.3 F | HEIGHT: 65 IN | BODY MASS INDEX: 33.59 KG/M2

## 2022-06-27 DIAGNOSIS — R73.9 HYPERGLYCEMIA: ICD-10-CM

## 2022-06-27 DIAGNOSIS — N29 NEPHROCALCINOSIS: ICD-10-CM

## 2022-06-27 DIAGNOSIS — E66.09 CLASS 1 OBESITY DUE TO EXCESS CALORIES WITHOUT SERIOUS COMORBIDITY WITH BODY MASS INDEX (BMI) OF 33.0 TO 33.9 IN ADULT: ICD-10-CM

## 2022-06-27 DIAGNOSIS — R03.0 ELEVATED BLOOD PRESSURE READING: ICD-10-CM

## 2022-06-27 DIAGNOSIS — E83.59 NEPHROCALCINOSIS: ICD-10-CM

## 2022-06-27 DIAGNOSIS — I10 ESSENTIAL HYPERTENSION: ICD-10-CM

## 2022-06-27 DIAGNOSIS — E55.9 VITAMIN D DEFICIENCY: ICD-10-CM

## 2022-06-27 DIAGNOSIS — G56.03 BILATERAL CARPAL TUNNEL SYNDROME: ICD-10-CM

## 2022-06-27 DIAGNOSIS — Z12.11 ENCOUNTER FOR SCREENING COLONOSCOPY: ICD-10-CM

## 2022-06-27 DIAGNOSIS — Z76.89 ESTABLISHING CARE WITH NEW DOCTOR, ENCOUNTER FOR: Primary | ICD-10-CM

## 2022-06-27 DIAGNOSIS — Z00.00 ANNUAL PHYSICAL EXAM: ICD-10-CM

## 2022-06-27 DIAGNOSIS — Z11.59 NEED FOR HEPATITIS C SCREENING TEST: ICD-10-CM

## 2022-06-27 DIAGNOSIS — E78.00 PURE HYPERCHOLESTEROLEMIA: ICD-10-CM

## 2022-06-27 DIAGNOSIS — G47.09 OTHER INSOMNIA: ICD-10-CM

## 2022-06-27 PROBLEM — E66.811 OBESITY (BMI 30.0-34.9): Status: ACTIVE | Noted: 2022-06-27

## 2022-06-27 PROBLEM — E66.9 OBESITY (BMI 30.0-34.9): Status: ACTIVE | Noted: 2022-06-27

## 2022-06-27 LAB
25(OH)D3 SERPL-MCNC: 23.9 NG/ML (ref 30–100)
ALBUMIN SERPL-MCNC: 4.4 G/DL (ref 3.5–5.2)
ALBUMIN/GLOB SERPL: 1.4 G/DL
ALP SERPL-CCNC: 73 U/L (ref 39–117)
ALT SERPL W P-5'-P-CCNC: 30 U/L (ref 1–41)
ANION GAP SERPL CALCULATED.3IONS-SCNC: 9.5 MMOL/L (ref 5–15)
AST SERPL-CCNC: 23 U/L (ref 1–40)
BACTERIA UR QL AUTO: ABNORMAL /HPF
BASOPHILS # BLD AUTO: 0.05 10*3/MM3 (ref 0–0.2)
BASOPHILS NFR BLD AUTO: 0.9 % (ref 0–1.5)
BILIRUB SERPL-MCNC: 0.5 MG/DL (ref 0–1.2)
BILIRUB UR QL STRIP: NEGATIVE
BUN SERPL-MCNC: 18 MG/DL (ref 6–20)
BUN/CREAT SERPL: 15.9 (ref 7–25)
CALCIUM 24H UR-MCNC: 28.6 MG/DL
CALCIUM SPEC-SCNC: 10.4 MG/DL (ref 8.6–10.5)
CHLORIDE SERPL-SCNC: 101 MMOL/L (ref 98–107)
CHOLEST SERPL-MCNC: 212 MG/DL (ref 0–200)
CLARITY UR: CLEAR
CO2 SERPL-SCNC: 28.5 MMOL/L (ref 22–29)
COD CRY URNS QL: ABNORMAL /HPF
COLOR UR: YELLOW
CREAT SERPL-MCNC: 1.13 MG/DL (ref 0.76–1.27)
CREAT UR-MCNC: 189.9 MG/DL
DEPRECATED RDW RBC AUTO: 40.5 FL (ref 37–54)
EGFRCR SERPLBLD CKD-EPI 2021: 80.7 ML/MIN/1.73
EOSINOPHIL # BLD AUTO: 0.11 10*3/MM3 (ref 0–0.4)
EOSINOPHIL NFR BLD AUTO: 2 % (ref 0.3–6.2)
ERYTHROCYTE [DISTWIDTH] IN BLOOD BY AUTOMATED COUNT: 12.5 % (ref 12.3–15.4)
GLOBULIN UR ELPH-MCNC: 3.2 GM/DL
GLUCOSE SERPL-MCNC: 104 MG/DL (ref 65–99)
GLUCOSE UR STRIP-MCNC: NEGATIVE MG/DL
HBA1C MFR BLD: 6 % (ref 4.8–5.6)
HCT VFR BLD AUTO: 48.4 % (ref 37.5–51)
HCV AB SER DONR QL: NORMAL
HDLC SERPL-MCNC: 41 MG/DL (ref 40–60)
HGB BLD-MCNC: 15.7 G/DL (ref 13–17.7)
HGB UR QL STRIP.AUTO: NEGATIVE
HYALINE CASTS UR QL AUTO: ABNORMAL /LPF
IMM GRANULOCYTES # BLD AUTO: 0.01 10*3/MM3 (ref 0–0.05)
IMM GRANULOCYTES NFR BLD AUTO: 0.2 % (ref 0–0.5)
KETONES UR QL STRIP: NEGATIVE
LDLC SERPL CALC-MCNC: 136 MG/DL (ref 0–100)
LDLC/HDLC SERPL: 3.23 {RATIO}
LEUKOCYTE ESTERASE UR QL STRIP.AUTO: NEGATIVE
LYMPHOCYTES # BLD AUTO: 1.79 10*3/MM3 (ref 0.7–3.1)
LYMPHOCYTES NFR BLD AUTO: 32.8 % (ref 19.6–45.3)
MCH RBC QN AUTO: 28.8 PG (ref 26.6–33)
MCHC RBC AUTO-ENTMCNC: 32.4 G/DL (ref 31.5–35.7)
MCV RBC AUTO: 88.6 FL (ref 79–97)
MONOCYTES # BLD AUTO: 0.49 10*3/MM3 (ref 0.1–0.9)
MONOCYTES NFR BLD AUTO: 9 % (ref 5–12)
NEUTROPHILS NFR BLD AUTO: 3.01 10*3/MM3 (ref 1.7–7)
NEUTROPHILS NFR BLD AUTO: 55.1 % (ref 42.7–76)
NITRITE UR QL STRIP: NEGATIVE
NRBC BLD AUTO-RTO: 0 /100 WBC (ref 0–0.2)
PH UR STRIP.AUTO: 5.5 [PH] (ref 5–8)
PLATELET # BLD AUTO: 227 10*3/MM3 (ref 140–450)
PMV BLD AUTO: 11 FL (ref 6–12)
POTASSIUM SERPL-SCNC: 4 MMOL/L (ref 3.5–5.2)
PROT ?TM UR-MCNC: 7.9 MG/DL
PROT SERPL-MCNC: 7.6 G/DL (ref 6–8.5)
PROT UR QL STRIP: NEGATIVE
PROT/CREAT UR: 0.04 MG/G{CREAT}
RBC # BLD AUTO: 5.46 10*6/MM3 (ref 4.14–5.8)
RBC # UR STRIP: ABNORMAL /HPF
REF LAB TEST METHOD: ABNORMAL
SODIUM SERPL-SCNC: 139 MMOL/L (ref 136–145)
SP GR UR STRIP: 1.02 (ref 1–1.03)
SQUAMOUS #/AREA URNS HPF: ABNORMAL /HPF
TRIGL SERPL-MCNC: 192 MG/DL (ref 0–150)
TSH SERPL DL<=0.05 MIU/L-ACNC: 1.39 UIU/ML (ref 0.27–4.2)
UROBILINOGEN UR QL STRIP: NORMAL
VLDLC SERPL-MCNC: 35 MG/DL (ref 5–40)
WBC # UR STRIP: ABNORMAL /HPF
WBC NRBC COR # BLD: 5.46 10*3/MM3 (ref 3.4–10.8)

## 2022-06-27 PROCEDURE — 86803 HEPATITIS C AB TEST: CPT | Performed by: STUDENT IN AN ORGANIZED HEALTH CARE EDUCATION/TRAINING PROGRAM

## 2022-06-27 PROCEDURE — 84156 ASSAY OF PROTEIN URINE: CPT | Performed by: STUDENT IN AN ORGANIZED HEALTH CARE EDUCATION/TRAINING PROGRAM

## 2022-06-27 PROCEDURE — 83036 HEMOGLOBIN GLYCOSYLATED A1C: CPT | Performed by: STUDENT IN AN ORGANIZED HEALTH CARE EDUCATION/TRAINING PROGRAM

## 2022-06-27 PROCEDURE — 82570 ASSAY OF URINE CREATININE: CPT | Performed by: STUDENT IN AN ORGANIZED HEALTH CARE EDUCATION/TRAINING PROGRAM

## 2022-06-27 PROCEDURE — 36415 COLL VENOUS BLD VENIPUNCTURE: CPT | Performed by: STUDENT IN AN ORGANIZED HEALTH CARE EDUCATION/TRAINING PROGRAM

## 2022-06-27 PROCEDURE — 81001 URINALYSIS AUTO W/SCOPE: CPT | Performed by: STUDENT IN AN ORGANIZED HEALTH CARE EDUCATION/TRAINING PROGRAM

## 2022-06-27 PROCEDURE — 85025 COMPLETE CBC W/AUTO DIFF WBC: CPT | Performed by: STUDENT IN AN ORGANIZED HEALTH CARE EDUCATION/TRAINING PROGRAM

## 2022-06-27 PROCEDURE — 82310 ASSAY OF CALCIUM: CPT | Performed by: STUDENT IN AN ORGANIZED HEALTH CARE EDUCATION/TRAINING PROGRAM

## 2022-06-27 PROCEDURE — 82306 VITAMIN D 25 HYDROXY: CPT | Performed by: STUDENT IN AN ORGANIZED HEALTH CARE EDUCATION/TRAINING PROGRAM

## 2022-06-27 PROCEDURE — 99213 OFFICE O/P EST LOW 20 MIN: CPT | Performed by: STUDENT IN AN ORGANIZED HEALTH CARE EDUCATION/TRAINING PROGRAM

## 2022-06-27 PROCEDURE — 80061 LIPID PANEL: CPT | Performed by: STUDENT IN AN ORGANIZED HEALTH CARE EDUCATION/TRAINING PROGRAM

## 2022-06-27 PROCEDURE — 84443 ASSAY THYROID STIM HORMONE: CPT | Performed by: STUDENT IN AN ORGANIZED HEALTH CARE EDUCATION/TRAINING PROGRAM

## 2022-06-27 PROCEDURE — 99386 PREV VISIT NEW AGE 40-64: CPT | Performed by: STUDENT IN AN ORGANIZED HEALTH CARE EDUCATION/TRAINING PROGRAM

## 2022-06-27 PROCEDURE — 80053 COMPREHEN METABOLIC PANEL: CPT | Performed by: STUDENT IN AN ORGANIZED HEALTH CARE EDUCATION/TRAINING PROGRAM

## 2022-06-27 RX ORDER — AMLODIPINE BESYLATE 10 MG/1
10 TABLET ORAL DAILY
Qty: 90 TABLET | Refills: 3 | Status: SHIPPED | OUTPATIENT
Start: 2022-06-27 | End: 2022-09-28 | Stop reason: SDUPTHER

## 2022-06-27 RX ORDER — TRAZODONE HYDROCHLORIDE 50 MG/1
50 TABLET ORAL NIGHTLY
Qty: 30 TABLET | Refills: 2 | Status: SHIPPED | OUTPATIENT
Start: 2022-06-27 | End: 2022-09-06 | Stop reason: HOSPADM

## 2022-06-27 NOTE — PROGRESS NOTES
Chief Complaint  Establish Care and Annual Exam    Subjective          Isidoro Estrella presents to [facility]  History of Present Illness    Previous PCP: Christy Guerrero    Insomnia:    Sleeps 4 hours a night.  Has trouble with sleep initiation.  Does not stay up on electronics.  Does work two jobs, but even when has sufficient time for sleep finds it difficult to go to bed as his mind is racing.  Works at [employer] as well as at [employer].  Melatonin when trialed doesn't seem to help.  Benadryl or other OTC sleep aids gives him uncomfortable nightmares.    Thumb Tingling:    For last month or so, having a tingling sensation in both thumbs, with right worse than left.    Misc:    Works in [employer] as well as at [employer].    No history of MI, CVA or  Cancer diagnosis.  No known history of COVID infection.     PHQ-9 Depression Screening  Little interest or pleasure in doing things? 0-->not at all   Feeling down, depressed, or hopeless? 0-->not at all   Trouble falling or staying asleep, or sleeping too much?     Feeling tired or having little energy?     Poor appetite or overeating?     Feeling bad about yourself - or that you are a failure or have let yourself or your family down?     Trouble concentrating on things, such as reading the newspaper or watching television?     Moving or speaking so slowly that other people could have noticed? Or the opposite - being so fidgety or restless that you have been moving around a lot more than usual?     Thoughts that you would be better off dead, or of hurting yourself in some way?     PHQ-9 Total Score 0   If you checked off any problems, how difficult have these problems made it for you to do your work, take care of things at home, or get along with other people?             Current Outpatient Medications   Medication Instructions   • amLODIPine (NORVASC) 10 mg, Oral, Daily   • Elastic Bandages & Supports (Wrist Brace Deluxe)  "misc 1 Units, Does not apply, Nightly   • traZODone (DESYREL) 50 mg, Oral, Nightly       The following portions of the patient's history were reviewed and updated as appropriate: allergies, current medications, past family history, past medical history, past social history, past surgical history, and problem list.    Objective   Vital Signs:   /98 Comment: MANUAL  Pulse 68   Temp 97.3 °F (36.3 °C)   Ht 165.1 cm (65\")   Wt 91.4 kg (201 lb 9.6 oz)   SpO2 98%   BMI 33.55 kg/m²     Wt Readings from Last 3 Encounters:   06/27/22 91.4 kg (201 lb 9.6 oz)   07/06/21 92.6 kg (204 lb 3.2 oz)   03/22/21 90.7 kg (200 lb)     BP Readings from Last 3 Encounters:   06/27/22 146/98   07/06/21 132/90   01/15/18 122/82     Physical Exam  Vitals reviewed.   Constitutional:       Appearance: Normal appearance.   HENT:      Head: Normocephalic and atraumatic.   Eyes:      Conjunctiva/sclera: Conjunctivae normal.   Cardiovascular:      Rate and Rhythm: Normal rate and regular rhythm.      Pulses: Normal pulses.      Heart sounds: Normal heart sounds.   Pulmonary:      Effort: Pulmonary effort is normal.      Breath sounds: Normal breath sounds. No wheezing.   Abdominal:      General: Abdomen is flat.      Palpations: Abdomen is soft. There is no mass.      Tenderness: There is no abdominal tenderness.   Musculoskeletal:      Right lower leg: No edema.      Left lower leg: No edema.      Comments: Negative Phalen's test bilaterally.  Tinel's sign positive right upper extremity, but negative left upper extremity   Skin:     General: Skin is warm and dry.   Neurological:      General: No focal deficit present.      Mental Status: He is alert. Mental status is at baseline.   Psychiatric:         Mood and Affect: Mood normal.         Behavior: Behavior normal.         Thought Content: Thought content normal.         Judgment: Judgment normal.          Result Review :   The following data was reviewed by: Cirilo Oneal MD on " 06/27/2022:           Lab Results   Component Value Date    BILIRUBINUR Negative 07/06/2021       Procedures        Assessment and Plan    Diagnoses and all orders for this visit:    1. Establishing care with new doctor, encounter for (Primary)    2. Annual physical exam  -     CBC & Differential  -     Comprehensive Metabolic Panel  -     Hemoglobin A1c  -     Hepatitis C Antibody  -     Lipid Panel  -     TSH  -     Vitamin D 25 Hydroxy    3. Need for hepatitis C screening test  -     Hepatitis C Antibody    4. Elevated blood pressure reading    5. Hyperglycemia  -     Comprehensive Metabolic Panel  -     Hemoglobin A1c    6. Pure hypercholesterolemia    7. Vitamin D deficiency  -     Vitamin D 25 Hydroxy    8. Medullary Nephrocalcinosis  -     Calcium, Urine, Random - Urine, Clean Catch  -     Protein / Creatinine Ratio, Urine - Urine, Clean Catch  -     Urinalysis With Microscopic - Urine, Clean Catch    9. Class 1 obesity due to excess calories without serious comorbidity with body mass index (BMI) of 33.0 to 33.9 in adult    10. Other insomnia  -     traZODone (DESYREL) 50 MG tablet; Take 1 tablet by mouth Every Night.  Dispense: 30 tablet; Refill: 2    11. Essential hypertension  -     amLODIPine (NORVASC) 10 MG tablet; Take 1 tablet by mouth Daily.  Dispense: 90 tablet; Refill: 3    12. Bilateral carpal tunnel syndrome  -     Elastic Bandages & Supports (Wrist Brace Deluxe) misc; 1 Units Every Night.  Dispense: 2 each; Refill: 0    13. Encounter for screening colonoscopy  -     Ambulatory Referral For Screening Colonoscopy      HTN:  -will start amlodipine    Insomnia:  -sleep hygiene counseling today  -will trial trazodone, okay with continuing melatonin    Health Maintenance:  -nutritional counseling on the components of a heart healthy diet  -exercise counseling, recommending at least 2.5 hours of moderate exercise weekly      There are no discontinued medications.       Follow Up   Return in about 3  months (around 9/27/2022) for HTN.  Patient was given instructions and counseling regarding his condition or for health maintenance advice. Please see specific information pulled into the AVS if appropriate.       Cirilo Oneal MD  06/27/22  09:55 EDT

## 2022-06-28 ENCOUNTER — PATIENT ROUNDING (BHMG ONLY) (OUTPATIENT)
Dept: INTERNAL MEDICINE | Facility: CLINIC | Age: 47
End: 2022-06-28

## 2022-06-28 DIAGNOSIS — E55.9 VITAMIN D DEFICIENCY: Primary | ICD-10-CM

## 2022-06-28 DIAGNOSIS — R82.994 HYPERCALCIURIA: ICD-10-CM

## 2022-07-14 ENCOUNTER — PREP FOR SURGERY (OUTPATIENT)
Dept: OTHER | Facility: HOSPITAL | Age: 47
End: 2022-07-14

## 2022-07-14 ENCOUNTER — OFFICE VISIT (OUTPATIENT)
Dept: SURGERY | Facility: CLINIC | Age: 47
End: 2022-07-14

## 2022-07-14 VITALS — RESPIRATION RATE: 16 BRPM | HEART RATE: 77 BPM | BODY MASS INDEX: 33.15 KG/M2 | WEIGHT: 199 LBS | HEIGHT: 65 IN

## 2022-07-14 DIAGNOSIS — Z12.11 SCREENING FOR MALIGNANT NEOPLASM OF COLON: Primary | ICD-10-CM

## 2022-07-14 PROCEDURE — S0285 CNSLT BEFORE SCREEN COLONOSC: HCPCS | Performed by: NURSE PRACTITIONER

## 2022-07-14 RX ORDER — POLYETHYLENE GLYCOL 3350 17 G/17G
POWDER, FOR SOLUTION ORAL
Qty: 238 PACKET | Refills: 0 | Status: SHIPPED | OUTPATIENT
Start: 2022-07-14 | End: 2022-09-06 | Stop reason: HOSPADM

## 2022-07-14 RX ORDER — LORATADINE 10 MG/1
10 TABLET ORAL DAILY
COMMUNITY
End: 2022-09-28

## 2022-07-14 NOTE — PROGRESS NOTES
"Chief Complaint: Colonoscopy (consult)    Subjective      Colonoscopy consultation       History of Present Illness  Isidoro Estrella is a 46 y.o. male presents to [facility] for colonoscopy consultation.    Patient presents today on referral from Dr. Cirilo Oneal colonoscopy consultation.  Patient denies any abdominal pain, change in bowel habit, rectal bleeding.    Denies any family history of colorectal cancer.    No previous colonoscopy.  Objective     Past Medical History:   Diagnosis Date   • Allergic    • Gallstones    • Kidney stone        Past Surgical History:   Procedure Laterality Date   • APPENDECTOMY     • KIDNEY STONE SURGERY         Outpatient Medications Marked as Taking for the 7/14/22 encounter (Office Visit) with Nathan, April, APRN   Medication Sig Dispense Refill   • amLODIPine (NORVASC) 10 MG tablet Take 1 tablet by mouth Daily. 90 tablet 3   • Cholecalciferol 50 MCG (2000 UT) capsule Take 1 capsule by mouth Daily. 90 each 3   • Elastic Bandages & Supports (Wrist Brace Deluxe) misc 1 Units Every Night. 2 each 0   • loratadine (Claritin) 10 MG tablet Take 10 mg by mouth Daily.     • traZODone (DESYREL) 50 MG tablet Take 1 tablet by mouth Every Night. 30 tablet 2       Allergies   Allergen Reactions   • Penicillins Rash        Family History   Problem Relation Age of Onset   • Diabetes Father        Social History     Socioeconomic History   • Marital status:    Tobacco Use   • Smoking status: Never Smoker   • Smokeless tobacco: Never Used   Vaping Use   • Vaping Use: Never used   Substance and Sexual Activity   • Alcohol use: Never   • Drug use: Never   • Sexual activity: Defer       Review of Systems   Constitutional: Negative for chills and fever.   Gastrointestinal: Negative for abdominal distention, abdominal pain, anal bleeding, blood in stool, constipation, diarrhea and rectal pain.        Vital Signs:   Pulse 77   Resp 16   Ht 165.1 cm (65\")   " Wt 90.3 kg (199 lb)   BMI 33.12 kg/m²      Physical Exam  Vitals and nursing note reviewed.   Constitutional:       General: He is not in acute distress.     Appearance: Normal appearance.   HENT:      Head: Normocephalic.   Cardiovascular:      Rate and Rhythm: Normal rate.   Pulmonary:      Effort: Pulmonary effort is normal.      Breath sounds: No stridor. No wheezing.   Abdominal:      General: Abdomen is flat.      Palpations: Abdomen is soft.      Tenderness: There is no guarding.   Musculoskeletal:         General: Normal range of motion.   Skin:     General: Skin is warm and dry.   Neurological:      General: No focal deficit present.      Mental Status: He is alert and oriented to person, place, and time.   Psychiatric:         Mood and Affect: Mood normal.         Thought Content: Thought content normal.          Result Review :          []  Laboratory  []  Radiology  []  Pathology  []  Microbiology  []  EKG/Telemetry   []  Cardiology/Vascular   [x]  Old records  Today I have reviewed from Dr. Oneal Previous office note.     Assessment and Plan    Diagnoses and all orders for this visit:    1. Screening for malignant neoplasm of colon (Primary)    Other orders  -     polyethylene glycol (MIRALAX) 17 g packet; Take as directed.  Instructions given in office.  Dispense: 238 g bottle  Dispense: 238 packet; Refill: 0    white prep    Follow Up   Return for Schedule colonoscopy with Dr. Lopez on 9/6/2022 at [facility].     Hospital arrival time: 10:30.    Possible risks/complications, benefits, and alternatives to surgical or invasive procedure have been explained to patient and/or legal guardian.     Patient has been evaluated and can tolerate anesthesia and/or sedation. Risks, benefits, and alternatives to anesthesia and sedation have been explained to patient and/or legal guardian.  Patient verbalizes understanding is will proceed with above plan.    Patient was given instructions and  counseling regarding his condition or for health maintenance advice. Please see specific information pulled into the AVS if appropriate.     EMR dragon/transcription disclaimer: Much of this encounter note is an electronic transcription/translation of spoken language to printed text. Electronic translation of spoken language may permit erroneous, or at times nonsensical words or phrases to be inadvertently transcribed; although I have reviewed the note for such errors, some may still exist.

## 2022-09-06 ENCOUNTER — HOSPITAL ENCOUNTER (OUTPATIENT)
Facility: HOSPITAL | Age: 47
Setting detail: HOSPITAL OUTPATIENT SURGERY
Discharge: HOME | End: 2022-09-06
Attending: SURGERY | Admitting: SURGERY

## 2022-09-06 ENCOUNTER — ANESTHESIA EVENT (OUTPATIENT)
Dept: GASTROENTEROLOGY | Facility: HOSPITAL | Age: 47
End: 2022-09-06

## 2022-09-06 ENCOUNTER — ANESTHESIA (OUTPATIENT)
Dept: GASTROENTEROLOGY | Facility: HOSPITAL | Age: 47
End: 2022-09-06

## 2022-09-06 VITALS
HEART RATE: 75 BPM | SYSTOLIC BLOOD PRESSURE: 121 MMHG | OXYGEN SATURATION: 97 % | DIASTOLIC BLOOD PRESSURE: 92 MMHG | TEMPERATURE: 97.7 F | RESPIRATION RATE: 14 BRPM | WEIGHT: 200.62 LBS | BODY MASS INDEX: 33.38 KG/M2

## 2022-09-06 DIAGNOSIS — Z12.11 SCREENING FOR MALIGNANT NEOPLASM OF COLON: ICD-10-CM

## 2022-09-06 PROCEDURE — 25010000002 PROPOFOL 10 MG/ML EMULSION: Performed by: NURSE ANESTHETIST, CERTIFIED REGISTERED

## 2022-09-06 PROCEDURE — 88305 TISSUE EXAM BY PATHOLOGIST: CPT | Performed by: SURGERY

## 2022-09-06 RX ORDER — SODIUM CHLORIDE, SODIUM LACTATE, POTASSIUM CHLORIDE, CALCIUM CHLORIDE 600; 310; 30; 20 MG/100ML; MG/100ML; MG/100ML; MG/100ML
30 INJECTION, SOLUTION INTRAVENOUS CONTINUOUS
Status: DISCONTINUED | OUTPATIENT
Start: 2022-09-06 | End: 2022-09-06 | Stop reason: HOSPADM

## 2022-09-06 RX ORDER — PROPOFOL 10 MG/ML
VIAL (ML) INTRAVENOUS AS NEEDED
Status: DISCONTINUED | OUTPATIENT
Start: 2022-09-06 | End: 2022-09-06 | Stop reason: SURG

## 2022-09-06 RX ORDER — LIDOCAINE HYDROCHLORIDE 20 MG/ML
INJECTION, SOLUTION EPIDURAL; INFILTRATION; INTRACAUDAL; PERINEURAL AS NEEDED
Status: DISCONTINUED | OUTPATIENT
Start: 2022-09-06 | End: 2022-09-06 | Stop reason: SURG

## 2022-09-06 RX ADMIN — PROPOFOL 100 MG: 10 INJECTION, EMULSION INTRAVENOUS at 11:50

## 2022-09-06 RX ADMIN — LIDOCAINE HYDROCHLORIDE 100 MG: 20 INJECTION, SOLUTION EPIDURAL; INFILTRATION; INTRACAUDAL; PERINEURAL at 11:50

## 2022-09-06 RX ADMIN — SODIUM CHLORIDE, POTASSIUM CHLORIDE, SODIUM LACTATE AND CALCIUM CHLORIDE: 600; 310; 30; 20 INJECTION, SOLUTION INTRAVENOUS at 11:49

## 2022-09-06 RX ADMIN — PROPOFOL 150 MCG/KG/MIN: 10 INJECTION, EMULSION INTRAVENOUS at 11:51

## 2022-09-06 NOTE — ANESTHESIA PREPROCEDURE EVALUATION
Anesthesia Evaluation     Patient summary reviewed and Nursing notes reviewed   no history of anesthetic complications:  NPO Solid Status: > 8 hours  NPO Liquid Status: > 2 hours           Airway   Mallampati: II  TM distance: >3 FB  Neck ROM: full  No difficulty expected  Dental      Pulmonary - negative pulmonary ROS and normal exam    breath sounds clear to auscultation  Cardiovascular - normal exam  Exercise tolerance: good (4-7 METS)    Rhythm: regular  Rate: normal    (+) hypertension,       Neuro/Psych- negative ROS  GI/Hepatic/Renal/Endo    (+)   renal disease stones,     Musculoskeletal (-) negative ROS    Abdominal    Substance History - negative use     OB/GYN negative ob/gyn ROS         Other - negative ROS       ROS/Med Hx Other: PAT Nursing Notes unavailable.                   Anesthesia Plan    ASA 2     general and MAC     (Patient understands anesthesia not responsible for dental damage.)  intravenous induction     Anesthetic plan, risks, benefits, and alternatives have been provided, discussed and informed consent has been obtained with: patient.    Use of blood products discussed with patient .   Plan discussed with CRNA.        CODE STATUS:

## 2022-09-06 NOTE — ANESTHESIA POSTPROCEDURE EVALUATION
Patient: Isidoro Estrella    Procedure Summary     Date: 09/06/22 Room / Location: [facility] ENDOSCOPY 1 / [facility] ENDOSCOPY    Anesthesia Start: 1149 Anesthesia Stop: 1209    Procedure: COLONOSCOPY WITH POLYPECTOMIES, HOT SNARE (N/A ) Diagnosis:       Screening for malignant neoplasm of colon      (Screening for malignant neoplasm of colon [Z12.11])    Surgeons: Sharif Lopez MD Provider: Isabel Jolley MD    Anesthesia Type: general, MAC ASA Status: 2          Anesthesia Type: general, MAC    Vitals  Vitals Value Taken Time   /92 09/06/22 1235   Temp 36.5 °C (97.7 °F) 09/06/22 1235   Pulse 76 09/06/22 1237   Resp 14 09/06/22 1235   SpO2 96 % 09/06/22 1237   Vitals shown include unvalidated device data.        Post Anesthesia Care and Evaluation    Patient location during evaluation: bedside  Patient participation: complete - patient participated  Level of consciousness: awake  Pain score: 0  Pain management: adequate    Airway patency: patent  Anesthetic complications: No anesthetic complications  PONV Status: none  Cardiovascular status: acceptable and stable  Respiratory status: acceptable and room air  Hydration status: acceptable    Comments: An Anesthesiologist personally participated in the most demanding procedures (including induction and emergence if applicable) in the anesthesia plan, monitored the course of anesthesia administration at frequent intervals and remained physically present and available for immediate diagnosis and treatment of emergencies.

## 2022-09-06 NOTE — H&P
General Surgery/Colorectal Surgery Note    Patient Name:  Isidoro Estrella  YOB: 1975  [id number]    Referring Provider: Sharif Lopez, *      Patient Care Team:  Cirilo Oneal MD as PCP - General (Internal Medicine)  Ariana Evans APRN as Nurse Practitioner (Nurse Practitioner)    Subjective .     History of present illness:    HPI   referral from Dr. Cirilo Oneal colonoscopy consultation.  Patient denies any abdominal pain, change in bowel habit, rectal bleeding.     Denies any family history of colorectal cancer.     No previous colonoscopy.    History:  Past Medical History:   Diagnosis Date   • Allergic    • Gallstones    • Kidney stone        Past Surgical History:   Procedure Laterality Date   • APPENDECTOMY     • KIDNEY STONE SURGERY         Family History   Problem Relation Age of Onset   • Diabetes Father        Social History     Tobacco Use   • Smoking status: Never Smoker   • Smokeless tobacco: Never Used   Vaping Use   • Vaping Use: Never used   Substance Use Topics   • Alcohol use: Never   • Drug use: Never       Review of Systems: See HPI    Review of Systems            Medications Prior to Admission   Medication Sig Dispense Refill Last Dose   • amLODIPine (NORVASC) 10 MG tablet Take 1 tablet by mouth Daily. 90 tablet 3    • Cholecalciferol 50 MCG (2000 UT) capsule Take 1 capsule by mouth Daily. 90 each 3    • Elastic Bandages & Supports (Wrist Brace Deluxe) misc 1 Units Every Night. 2 each 0    • loratadine (Claritin) 10 MG tablet Take 10 mg by mouth Daily.      • polyethylene glycol (MIRALAX) 17 g packet Take as directed.  Instructions given in office.  Dispense: 238 g bottle 238 packet 0    • traZODone (DESYREL) 50 MG tablet Take 1 tablet by mouth Every Night. 30 tablet 2          Home Meds:      Prior to Admission medications    Medication Sig Start Date End Date Taking? Authorizing Provider   amLODIPine (NORVASC) 10 MG tablet Take 1 tablet by mouth Daily. 6/27/22    Cirilo Oneal MD   Cholecalciferol 50 MCG (2000 UT) capsule Take 1 capsule by mouth Daily. 6/28/22   Cirilo Oneal MD   Elastic Bandages & Supports (Wrist Brace Deluxe) misc 1 Units Every Night. 6/27/22   Cirilo Oneal MD   loratadine (Claritin) 10 MG tablet Take 10 mg by mouth Daily.    Provider, MD Jorge   polyethylene glycol (MIRALAX) 17 g packet Take as directed.  Instructions given in office.  Dispense: 238 g bottle 7/14/22   Ariana Evans APRN   traZODone (DESYREL) 50 MG tablet Take 1 tablet by mouth Every Night. 6/27/22   Cirilo Oneal MD            Allergies:  Penicillins      Objective     Vital Signs        Physical Exam:     Physical Exam    NAD, A/O x 4, normal circulation, normal respiration      Result Review :     Assessment & Plan     There are no diagnoses linked to this encounter.       Risks including bleeding, perforation, pain, infection, missed polyp(s). Benefits, and alternatives of Colonoscopy discussed with patient.  All questions answered.  Consent verified.         Sharif Lopez MD  09/06/22 10:53 EDT

## 2022-09-07 LAB
CYTO UR: NORMAL
LAB AP CASE REPORT: NORMAL
LAB AP CLINICAL INFORMATION: NORMAL
PATH REPORT.FINAL DX SPEC: NORMAL
PATH REPORT.GROSS SPEC: NORMAL

## 2022-09-13 ENCOUNTER — PREP FOR SURGERY (OUTPATIENT)
Dept: OTHER | Facility: HOSPITAL | Age: 47
End: 2022-09-13

## 2022-09-13 ENCOUNTER — OFFICE VISIT (OUTPATIENT)
Dept: SURGERY | Facility: CLINIC | Age: 47
End: 2022-09-13

## 2022-09-13 VITALS — HEIGHT: 65 IN | BODY MASS INDEX: 33.66 KG/M2 | HEART RATE: 74 BPM | WEIGHT: 202 LBS

## 2022-09-13 DIAGNOSIS — Z12.11 SCREENING FOR MALIGNANT NEOPLASM OF COLON: ICD-10-CM

## 2022-09-13 DIAGNOSIS — Z86.010 HISTORY OF COLONIC POLYPS: ICD-10-CM

## 2022-09-13 DIAGNOSIS — D36.9 TUBULAR ADENOMA: ICD-10-CM

## 2022-09-13 DIAGNOSIS — Z98.890 S/P COLONOSCOPY WITH POLYPECTOMY: Primary | ICD-10-CM

## 2022-09-13 DIAGNOSIS — D12.6 SERRATED ADENOMA OF COLON: ICD-10-CM

## 2022-09-13 DIAGNOSIS — Z12.11 SCREENING FOR MALIGNANT NEOPLASM OF COLON: Primary | ICD-10-CM

## 2022-09-13 PROBLEM — Z86.0100 HISTORY OF COLONIC POLYPS: Status: ACTIVE | Noted: 2022-09-13

## 2022-09-13 PROCEDURE — 99212 OFFICE O/P EST SF 10 MIN: CPT | Performed by: NURSE PRACTITIONER

## 2022-09-13 RX ORDER — SODIUM, POTASSIUM,MAG SULFATES 17.5-3.13G
SOLUTION, RECONSTITUTED, ORAL ORAL
Qty: 354 ML | Refills: 0 | Status: SHIPPED | OUTPATIENT
Start: 2022-09-13

## 2022-09-14 NOTE — PROGRESS NOTES
Chief Complaint: Post-op Follow-up    Subjective      Follow-up visit       History of Present Illness  Isidoro Estrella is a 46 y.o. male presents to [facility] for follow-up visit.    Patient presents today for follow-up visit after undergoing a colonoscopy on 9/6/2022 performed by Dr. Sharif Lopez.    Patient was with a 10 mm tubular adenoma of the cecum; also with a 15 mm traditional serrated adenoma of the sigmoid colon per colonoscopy/pathology.  Resection and retrieval of both polyps was complete.    Pathology:  Clinical Information    Screening for malignant neoplasm of colon   Final Diagnosis   1. Cecum polyp, biopsy:                - Fragments of tubular adenoma        2. Sigmoid colon polyp, biopsy:                - Traditional serrated adenoma            Electronically signed by Amparo Ro MD      Colonoscopy was performed without difficulty.  The patient tolerated the procedure well.    Given how large the polyps were, think it is reasonable to repeat the colonoscopy in 3 months.    Patient currently denies any abdominal pain, change in bowel habit, or rectal bleeding.    Denies any family history of colorectal cancer.    Objective     Past Medical History:   Diagnosis Date   • Allergic    • Gallstones    • Hypertension    • Kidney stone        Past Surgical History:   Procedure Laterality Date   • APPENDECTOMY     • COLONOSCOPY N/A 9/6/2022    Procedure: COLONOSCOPY WITH POLYPECTOMIES, HOT SNARE;  Surgeon: Sharif Lopez MD;  Location: [facility] ENDOSCOPY;  Service: General;  Laterality: N/A;  COLON POLYPS   • KIDNEY STONE SURGERY         Outpatient Medications Marked as Taking for the 9/13/22 encounter (Office Visit) with Ariana Evans APRN   Medication Sig Dispense Refill   • amLODIPine (NORVASC) 10 MG tablet Take 1 tablet by mouth Daily. 90 tablet 3   • Elastic Bandages & Supports (Wrist Brace Deluxe) misc 1 Units Every Night. 2 each 0   •  "loratadine (CLARITIN) 10 MG tablet Take 10 mg by mouth Daily.         Allergies   Allergen Reactions   • Latex Itching and Swelling   • Penicillins Rash        Family History   Problem Relation Age of Onset   • Diabetes Father        Social History     Socioeconomic History   • Marital status:    Tobacco Use   • Smoking status: Never Smoker   • Smokeless tobacco: Never Used   Vaping Use   • Vaping Use: Never used   Substance and Sexual Activity   • Alcohol use: Never   • Drug use: Never   • Sexual activity: Defer       Review of Systems   Constitutional: Negative for chills and fever.   Gastrointestinal: Negative for abdominal distention, abdominal pain, anal bleeding, blood in stool, constipation, diarrhea and rectal pain.        Vital Signs:   Pulse 74   Ht 165.1 cm (65\")   Wt 91.6 kg (202 lb)   BMI 33.61 kg/m²      Physical Exam  Vitals and nursing note reviewed.   Constitutional:       General: He is not in acute distress.     Appearance: Normal appearance.   HENT:      Head: Normocephalic.   Cardiovascular:      Rate and Rhythm: Normal rate.   Pulmonary:      Effort: Pulmonary effort is normal.      Breath sounds: No stridor. No wheezing.   Abdominal:      Palpations: Abdomen is soft.      Tenderness: There is no guarding.   Musculoskeletal:         General: No deformity. Normal range of motion.   Skin:     General: Skin is warm and dry.      Coloration: Skin is not jaundiced.   Neurological:      General: No focal deficit present.      Mental Status: He is alert and oriented to person, place, and time.   Psychiatric:         Mood and Affect: Mood normal.         Thought Content: Thought content normal.          Result Review :          []  Laboratory  []  Radiology  [x]  Pathology  []  Microbiology  []  EKG/Telemetry   []  Cardiology/Vascular   [x]  Old records  Today I reviewed Dr. Lopez's colonoscopy and pathology.     Assessment and Plan    Diagnoses and all orders for this visit:    1. S/P " colonoscopy with polypectomy (Primary)    2. Tubular adenoma    3. Serrated adenoma of colon    4. Screening for malignant neoplasm of colon    Other orders  -     sodium-potassium-magnesium sulfates (Suprep Bowel Prep Kit) 17.5-3.13-1.6 GM/177ML solution oral solution; Take as directed.  Instructions given in office.  Dispense: 2 bottles  Dispense: 354 mL; Refill: 0        Follow Up   Return for Repeat colonoscopy on 1/12/2023 with Dr. Lopez at [facility].     Hospital arrival time: 0600.    Possible risks/complications, benefits, and alternatives to surgical or invasive procedure have been explained to patient and/or legal guardian.     Patient has been evaluated and can tolerate anesthesia and/or sedation. Risks, benefits, and alternatives to anesthesia and sedation have been explained to patient and/or legal guardian.  Patient verbalizes understanding and is will proceed with above plan.    Patient was given instructions and counseling regarding his condition or for health maintenance advice. Please see specific information pulled into the AVS if appropriate.

## 2022-09-28 ENCOUNTER — OFFICE VISIT (OUTPATIENT)
Dept: INTERNAL MEDICINE | Facility: CLINIC | Age: 47
End: 2022-09-28

## 2022-09-28 VITALS
BODY MASS INDEX: 34.02 KG/M2 | OXYGEN SATURATION: 98 % | WEIGHT: 204.2 LBS | TEMPERATURE: 98.2 F | HEART RATE: 72 BPM | SYSTOLIC BLOOD PRESSURE: 124 MMHG | HEIGHT: 65 IN | DIASTOLIC BLOOD PRESSURE: 84 MMHG

## 2022-09-28 DIAGNOSIS — G47.09 OTHER INSOMNIA: ICD-10-CM

## 2022-09-28 DIAGNOSIS — J30.89 SEASONAL ALLERGIC RHINITIS DUE TO OTHER ALLERGIC TRIGGER: ICD-10-CM

## 2022-09-28 DIAGNOSIS — E55.9 VITAMIN D DEFICIENCY: ICD-10-CM

## 2022-09-28 DIAGNOSIS — I10 ESSENTIAL HYPERTENSION: Primary | ICD-10-CM

## 2022-09-28 DIAGNOSIS — G56.03 BILATERAL CARPAL TUNNEL SYNDROME: ICD-10-CM

## 2022-09-28 DIAGNOSIS — R73.03 PREDIABETES: ICD-10-CM

## 2022-09-28 LAB
ALBUMIN SERPL-MCNC: 4.3 G/DL (ref 3.5–5.2)
ALBUMIN/GLOB SERPL: 1.5 G/DL
ALP SERPL-CCNC: 85 U/L (ref 39–117)
ALT SERPL W P-5'-P-CCNC: 49 U/L (ref 1–41)
ANION GAP SERPL CALCULATED.3IONS-SCNC: 11 MMOL/L (ref 5–15)
AST SERPL-CCNC: 31 U/L (ref 1–40)
BILIRUB SERPL-MCNC: 0.4 MG/DL (ref 0–1.2)
BUN SERPL-MCNC: 13 MG/DL (ref 6–20)
BUN/CREAT SERPL: 13.1 (ref 7–25)
CALCIUM SPEC-SCNC: 9.4 MG/DL (ref 8.6–10.5)
CHLORIDE SERPL-SCNC: 100 MMOL/L (ref 98–107)
CO2 SERPL-SCNC: 26 MMOL/L (ref 22–29)
CREAT SERPL-MCNC: 0.99 MG/DL (ref 0.76–1.27)
EGFRCR SERPLBLD CKD-EPI 2021: 94.6 ML/MIN/1.73
GLOBULIN UR ELPH-MCNC: 2.9 GM/DL
GLUCOSE SERPL-MCNC: 101 MG/DL (ref 65–99)
HBA1C MFR BLD: 5.7 % (ref 4.8–5.6)
POTASSIUM SERPL-SCNC: 3.8 MMOL/L (ref 3.5–5.2)
PROT SERPL-MCNC: 7.2 G/DL (ref 6–8.5)
SODIUM SERPL-SCNC: 137 MMOL/L (ref 136–145)

## 2022-09-28 PROCEDURE — 36415 COLL VENOUS BLD VENIPUNCTURE: CPT | Performed by: STUDENT IN AN ORGANIZED HEALTH CARE EDUCATION/TRAINING PROGRAM

## 2022-09-28 PROCEDURE — 80053 COMPREHEN METABOLIC PANEL: CPT | Performed by: STUDENT IN AN ORGANIZED HEALTH CARE EDUCATION/TRAINING PROGRAM

## 2022-09-28 PROCEDURE — 99214 OFFICE O/P EST MOD 30 MIN: CPT | Performed by: STUDENT IN AN ORGANIZED HEALTH CARE EDUCATION/TRAINING PROGRAM

## 2022-09-28 PROCEDURE — 82306 VITAMIN D 25 HYDROXY: CPT | Performed by: STUDENT IN AN ORGANIZED HEALTH CARE EDUCATION/TRAINING PROGRAM

## 2022-09-28 PROCEDURE — 83036 HEMOGLOBIN GLYCOSYLATED A1C: CPT | Performed by: STUDENT IN AN ORGANIZED HEALTH CARE EDUCATION/TRAINING PROGRAM

## 2022-09-28 RX ORDER — AMLODIPINE BESYLATE 10 MG/1
10 TABLET ORAL DAILY
Qty: 90 TABLET | Refills: 3 | Status: SHIPPED | OUTPATIENT
Start: 2022-09-28 | End: 2023-03-03 | Stop reason: SDUPTHER

## 2022-09-28 RX ORDER — POLYETHYLENE GLYCOL 3350 17 G/17G
POWDER, FOR SOLUTION ORAL SEE ADMIN INSTRUCTIONS
COMMUNITY
Start: 2022-07-14

## 2022-09-28 RX ORDER — CETIRIZINE HYDROCHLORIDE 10 MG/1
10 TABLET ORAL DAILY
Qty: 90 TABLET | Refills: 2 | Status: SHIPPED | OUTPATIENT
Start: 2022-09-28

## 2022-09-28 NOTE — PROGRESS NOTES
"Chief Complaint  Hypertension    Subjective          Isidoro Estrella presents to [facility]  History of Present Illness    Having some issues with allergies with season change.  Can't tolerate nasal sprays.  Using claritin.  Has not tried zyrtec.    Carpal tunnel (right worse than left).  Using brace, but still within quite a bit of tingling.  Requests PT referral.    Sleep is \"a little bit better.\"  Trazodone did help earlier this year when trialed.  Was, of note, taken off his trazodone by Dr. Lopez, and since then sleep has been improved without medicine    Reports he has had a tetanus booster within the past 10 years.    Current Outpatient Medications   Medication Instructions   • amLODIPine (NORVASC) 10 mg, Oral, Daily   • cetirizine (ZYRTEC) 10 mg, Oral, Daily   • Elastic Bandages & Supports (Wrist Brace Deluxe) misc 1 Units, Does not apply, Nightly   • polyethylene glycol (MIRALAX) 17 GM/SCOOP powder See Admin Instructions   • sodium-potassium-magnesium sulfates (Suprep Bowel Prep Kit) 17.5-3.13-1.6 GM/177ML solution oral solution Take as directed.  Instructions given in office.  Dispense: 2 bottles       The following portions of the patient's history were reviewed and updated as appropriate: allergies, current medications, past family history, past medical history, past social history, past surgical history, and problem list.    Objective   Vital Signs:   /84 (BP Location: Left arm, Patient Position: Sitting, Cuff Size: Adult)   Pulse 72   Temp 98.2 °F (36.8 °C) (Temporal)   Ht 165.1 cm (65\")   Wt 92.6 kg (204 lb 3.2 oz)   SpO2 98%   BMI 33.98 kg/m²     Wt Readings from Last 3 Encounters:   09/28/22 92.6 kg (204 lb 3.2 oz)   09/13/22 91.6 kg (202 lb)   09/06/22 91 kg (200 lb 9.9 oz)     BP Readings from Last 3 Encounters:   09/28/22 124/84   09/06/22 121/92   06/27/22 146/98     Physical Exam  Vitals reviewed.   Constitutional:       General: He is " not in acute distress.     Appearance: Normal appearance.   HENT:      Head: Normocephalic and atraumatic.   Eyes:      Conjunctiva/sclera: Conjunctivae normal.   Cardiovascular:      Rate and Rhythm: Normal rate and regular rhythm.      Pulses: Normal pulses.      Heart sounds: Normal heart sounds. No murmur heard.  Pulmonary:      Effort: Pulmonary effort is normal.      Breath sounds: Normal breath sounds. No wheezing.   Abdominal:      General: Abdomen is flat.      Palpations: Abdomen is soft. There is no mass.      Tenderness: There is no abdominal tenderness.   Musculoskeletal:      Right lower leg: No edema.      Left lower leg: No edema.   Skin:     General: Skin is warm and dry.   Neurological:      General: No focal deficit present.      Mental Status: He is alert. Mental status is at baseline.   Psychiatric:         Mood and Affect: Mood normal.         Behavior: Behavior normal.         Thought Content: Thought content normal.         Judgment: Judgment normal.         Result Review :   The following data was reviewed by: Cirilo Oneal MD on 09/28/2022:  Common labs    Common Labs 6/27/22 6/27/22 6/27/22 6/27/22    [id number] 0954 0954   Glucose   104 (A)    BUN   18    Creatinine   1.13    Sodium   139    Potassium   4.0    Chloride   101    Calcium   10.4    Albumin   4.40    Total Bilirubin   0.5    Alkaline Phosphatase   73    AST (SGOT)   23    ALT (SGPT)   30    WBC 5.46      Hemoglobin 15.7      Hematocrit 48.4      Platelets 227      Total Cholesterol    212 (A)   Triglycerides    192 (A)   HDL Cholesterol    41   LDL Cholesterol     136 (A)   Hemoglobin A1C  6.00 (A)     (A) Abnormal value                   Lab Results   Component Value Date    BILIRUBINUR Negative 06/27/2022       Procedures        Assessment and Plan    Diagnoses and all orders for this visit:    1. Essential hypertension (Primary)  -     Comprehensive Metabolic Panel  -     amLODIPine (NORVASC) 10 MG tablet; Take 1 tablet  by mouth Daily.  Dispense: 90 tablet; Refill: 3    2. Vitamin D deficiency  -     Vitamin D 25 Hydroxy    3. Prediabetes  -     Hemoglobin A1c  -     Comprehensive Metabolic Panel    4. Seasonal allergic rhinitis due to other allergic trigger  Comments:  -will trial zyrtec  Orders:  -     cetirizine (zyrTEC) 10 MG tablet; Take 1 tablet by mouth Daily.  Dispense: 90 tablet; Refill: 2    5. Bilateral carpal tunnel syndrome  Comments:  -will trial OT  -if no improvement, will consider referral to hand surgeon  Orders:  -     Ambulatory Referral to Occupational Therapy    6. Other insomnia  Comments:  -improved  -will monitor off trazodone          Medications Discontinued During This Encounter   Medication Reason   • amLODIPine (NORVASC) 10 MG tablet Reorder   • loratadine (CLARITIN) 10 MG tablet           Follow Up   Return in about 9 months (around 6/27/2023) for Annual physical.  Patient was given instructions and counseling regarding his condition or for health maintenance advice. Please see specific information pulled into the AVS if appropriate.       Cirilo Oneal MD  09/28/22  10:40 EDT

## 2022-09-29 DIAGNOSIS — E55.9 VITAMIN D DEFICIENCY: Primary | ICD-10-CM

## 2022-09-29 DIAGNOSIS — G47.09 OTHER INSOMNIA: ICD-10-CM

## 2022-09-29 LAB — 25(OH)D3 SERPL-MCNC: 19 NG/ML (ref 30–100)

## 2022-09-29 RX ORDER — TRAZODONE HYDROCHLORIDE 50 MG/1
50 TABLET ORAL NIGHTLY
Qty: 30 TABLET | Refills: 2 | OUTPATIENT
Start: 2022-09-29

## 2022-09-29 NOTE — TELEPHONE ENCOUNTER
Looks like medication was D/C on 9/6/2022 by Sharif Lopez MD for the following reason: Stop Taking at Discharge

## 2022-10-20 ENCOUNTER — TREATMENT (OUTPATIENT)
Dept: PHYSICAL THERAPY | Facility: CLINIC | Age: 47
End: 2022-10-20

## 2022-10-20 DIAGNOSIS — M79.641 PAIN IN BOTH HANDS: ICD-10-CM

## 2022-10-20 DIAGNOSIS — G56.03 BILATERAL CARPAL TUNNEL SYNDROME: Primary | ICD-10-CM

## 2022-10-20 DIAGNOSIS — M25.532 PAIN IN BOTH WRISTS: ICD-10-CM

## 2022-10-20 DIAGNOSIS — M25.531 PAIN IN BOTH WRISTS: ICD-10-CM

## 2022-10-20 DIAGNOSIS — M79.642 PAIN IN BOTH HANDS: ICD-10-CM

## 2022-10-20 PROCEDURE — 97166 OT EVAL MOD COMPLEX 45 MIN: CPT | Performed by: OCCUPATIONAL THERAPIST

## 2022-10-20 PROCEDURE — 97530 THERAPEUTIC ACTIVITIES: CPT | Performed by: OCCUPATIONAL THERAPIST

## 2022-10-20 PROCEDURE — 97112 NEUROMUSCULAR REEDUCATION: CPT | Performed by: OCCUPATIONAL THERAPIST

## 2022-10-20 NOTE — PROGRESS NOTES
Outpatient Occupational Therapy Ortho Initial Evaluation    Patient: Isidoro Estrella   : 1975  Diagnosis/ICD-10 Code:  Bilateral carpal tunnel syndrome [G56.03]  Referring practitioner: Cirilo Oneal MD  Date of Initial Visit: 10/20/2022  Today's Date: 10/20/2022  Patient seen for 1 sessions               Subjective Questionnaire: QuickDASH: 17      Subjective Evaluation    History of Present Illness  Mechanism of injury: For the last several months having numbness and tingling in B hands, unsure of the exact start date.  About 8 years ago had similar symptoms but it went away. Having difficulty writing and drawing.  Currently working full duty      Patient Occupation: Manual labor  Quality of life: excellent    Pain  Current pain ratin  Quality: needle-like  Relieving factors: change in position  Progression: worsening    Social Support  Lives in: multiple-level home  Lives with: alone    Hand dominance: right    Treatments  Previous treatment: immobilization (no change in symptoms)  Patient Goals  Patient goals for therapy: decreased pain and increased strength           Past Medical hx: history CTS B, kidney stones, appendix removed    Objective          Neurological Testing     Sensation     Wrist/Hand   Left   Diminished: light touch    Right   Diminished: light touch    Comments   Left light touch: Th 3.61; 2.83 SW   Right light touch: R 3.61; all other 2.83    Active Range of Motion     Left Wrist   Wrist flexion: 70 degrees   Wrist extension: 60 degrees     Right Wrist   Wrist flexion: 75 degrees   Wrist extension: 40 degrees     Additional Active Range of Motion Details  Full fist B       Strength/Myotome Testing     Left Wrist/Hand      (2nd hand position)   Left  strength (2nd hand position) 90 lbs    Right Wrist/Hand      (2nd hand position)   Right  strength (2nd hand position) 85 lbs          Assessment & Plan     Assessment  Impairments: abnormal coordination, abnormal  muscle firing, abnormal or restricted ROM, activity intolerance, impaired physical strength, lacks appropriate home exercise program, pain with function, safety issue and weight-bearing intolerance  Functional Limitations: carrying objects, lifting, pulling, pushing, reaching behind back, reaching overhead and unable to perform repetitive tasks  Assessment details: Pt presents with B hand pain.  Numbness and tingling with gripping activities.  He also reports that driving causes symptoms to increase.     Goals  Plan Goals: 1. The patient complains of pain in the B hand pain                  LTG 1: 12 weeks:  The patient will report a pain rating of 0/10 or better in order to improve sleep quality and tolerance to performance of activities of daily living.                                  STATUS:  New                  STG 1a: 4weeks:  The patient will report a pain rating of 3/10 or better.                                   STATUS:  New  2. The patient has limited ROM of the R wrist ext                    LTG 2: 12 weeks:  The patient will demonstrate 60 degrees of R wrist ext to allow the patient to WB.                                  STATUS:  New                   STG 2a: 4 weeks:  The patient will demonstrate 50 degrees of R wrist ext.                                  STATUS:  New                             3. The patient has limited strength of the R hand.                  LTG 3: 12 weeks:  The patient will demonstrate 90# in order to  In R hand.                                  STATUS:  New                  STG 3a: 4 weeks:  The patient will demonstrate tolerance of strengthening without numbness and tingling.                                  STATUS:  New  4. Carrying, Moving, and Handling Objects Functional Limitation                                   LTG 4: 12 weeks:  The patient will demonstrate 0% limitation by achieving a score of 11 on the Quick DASH.                                  STATUS:   New                  STG 4a: 4 weeks:  The patient will demonstrate 1-19% limitation by achieving a score of 13 on the Quick DASH.                                    STATUS:  New                    TREATMENT: Orthotic fabrication/fitting/management and training, Manual therapy, therapeutic exercise, home exercise instruction, and modalities as needed to include: electrical stimulation, ultrasound, moist heat, paraffin, fluidotherapy and ice.      Plan  Planned modality interventions: TENS, thermotherapy (hydrocollator packs), thermotherapy (paraffin bath), ultrasound and electrical stimulation/Russian stimulation  Other planned modality interventions: fluidotherapy  Planned therapy interventions: manual therapy, ADL retraining, motor coordination training, neuromuscular re-education, soft tissue mobilization, fine motor coordination training, body mechanics training, balance/weight-bearing training, functional ROM exercises, flexibility, spinal/joint mobilization, strengthening, stretching, therapeutic activities, IADL retraining, joint mobilization and home exercise program  Frequency: 2x week  Duration in weeks: 12  Treatment plan discussed with: patient        Patient is indicated for skilled occupational therapy services.    History # of Personal Factors and/or Comorbidities: MODERATE (1-2)  Examination of Body System(s): # of elements: MODERATE (3)  Clinical Presentation: EVOLVING  Clinical Decision Making: MODERATE     Evaluation:  Low Complexity:    0     mins  04562;  Mod Complexity:    20     mins  56643;  High Complexity:    0     mins  33432;    Timed:  Manual Therapy:    10     mins  39040;  Therapeutic Exercise:    0     mins  01256;  Therapeutic Activity:    10     mins  43016;     Neuromuscular Claudia:    10    mins  40556;    Ultrasound:     0     mins  91500;    Electrical Stimulation:    0     mins  39118;    Untimed:  Electrical Stimulation:    00     mins  63855 ( );  Fluidotherapy:        0     mins  36235  Paraffin:                          0    mins  11360    Timed Treatment:   30   mins   Total Treatment:     50   mins      OT SIGNATURE: Daksha Cheneyy, BI, OTR/L, CHT     Electronically signed    [place] LICENSE: [id number]   DATE TREATMENT INITIATED: 10/20/2022    Initial Certification  Certification Period: 10/20/2022 thru 1/17/2023  I certify that the therapy services are furnished while this patient is under my care.  The services outlined above are required by this patient, and will be reviewed every 90 days.     Signature:______________________________________________ PHYSICIAN:  Cirilo Oneal MD   NPI: [id number]                                      DATE:    Please sign and return via fax to [phone]   Thank you, [facility].

## 2022-10-24 ENCOUNTER — TREATMENT (OUTPATIENT)
Dept: PHYSICAL THERAPY | Facility: CLINIC | Age: 47
End: 2022-10-24

## 2022-10-24 DIAGNOSIS — M79.641 PAIN IN BOTH HANDS: ICD-10-CM

## 2022-10-24 DIAGNOSIS — G56.03 BILATERAL CARPAL TUNNEL SYNDROME: Primary | ICD-10-CM

## 2022-10-24 DIAGNOSIS — M79.642 PAIN IN BOTH HANDS: ICD-10-CM

## 2022-10-24 DIAGNOSIS — M25.531 PAIN IN BOTH WRISTS: ICD-10-CM

## 2022-10-24 DIAGNOSIS — M25.532 PAIN IN BOTH WRISTS: ICD-10-CM

## 2022-10-24 PROCEDURE — 97112 NEUROMUSCULAR REEDUCATION: CPT | Performed by: OCCUPATIONAL THERAPIST

## 2022-10-24 PROCEDURE — 97530 THERAPEUTIC ACTIVITIES: CPT | Performed by: OCCUPATIONAL THERAPIST

## 2022-10-24 NOTE — PROGRESS NOTES
Occupational Therapy Daily Treatment Note      Patient: Isidoro Estrella   : 1975  Referring practitioner: Cirilo Oneal MD  Date of Initial Visit: Type: THERAPY  Noted: 10/20/2022  Today's Date: 10/24/2022  Patient seen for 2 sessions    ICD-10-CM ICD-9-CM   1. Bilateral carpal tunnel syndrome  G56.03 354.0   2. Pain in both wrists  M25.531 719.43    M25.532    3. Pain in both hands  M79.641 729.5    M79.642           Isidoro Estrella reports Not as much tingling today 4/10 pain      Subjective     Objective   See Exercise, Manual, and Modality Logs for complete treatment.   Fibrotic restriction noted in B forearms at pronator teres, flexors, and carpal tunnel.  Pt responded well to manual treatments including cupping and tool assisted mobilization.     Assessment/Plan  Pt progressing with AROm and tolerance for mobilization of soft tissue.       Cont per POC           Timed:  Manual Therapy:    10     mins  93434;  Therapeutic Exercise:    0     mins  66060;  Therapeutic Activity:    10     mins  15748;     Neuromuscular Claudia:    10    mins  20119;    Ultrasound:     0     mins  57428;    Electrical Stimulation:    0     mins  66212;    Untimed:  Electrical Stimulation:    0     mins  80532 ( );  Fluidotherapy:        0    mins  36591  Paraffin:                          0    mins  00570    Timed Treatment:   30   mins   Total Treatment:     30   mins    OT SIGNATURE: BI Nicole, OTR/L, CHT     Electronically signed    [place] LICENSE: [id number]

## 2022-10-26 ENCOUNTER — TREATMENT (OUTPATIENT)
Dept: PHYSICAL THERAPY | Facility: CLINIC | Age: 47
End: 2022-10-26

## 2022-10-26 DIAGNOSIS — M79.642 PAIN IN BOTH HANDS: ICD-10-CM

## 2022-10-26 DIAGNOSIS — M79.641 PAIN IN BOTH HANDS: ICD-10-CM

## 2022-10-26 DIAGNOSIS — G56.03 BILATERAL CARPAL TUNNEL SYNDROME: Primary | ICD-10-CM

## 2022-10-26 DIAGNOSIS — M25.531 PAIN IN BOTH WRISTS: ICD-10-CM

## 2022-10-26 DIAGNOSIS — M25.532 PAIN IN BOTH WRISTS: ICD-10-CM

## 2022-10-26 PROCEDURE — 97530 THERAPEUTIC ACTIVITIES: CPT | Performed by: OCCUPATIONAL THERAPIST

## 2022-10-26 PROCEDURE — 97110 THERAPEUTIC EXERCISES: CPT | Performed by: OCCUPATIONAL THERAPIST

## 2022-10-26 NOTE — PROGRESS NOTES
Occupational Therapy Daily Treatment Note      Patient: Isidoro Estrella   : 1975  Referring practitioner: Cirilo Oneal MD  Date of Initial Visit: Type: THERAPY  Noted: 10/20/2022  Today's Date: 10/26/2022  Patient seen for 3 sessions    ICD-10-CM ICD-9-CM   1. Bilateral carpal tunnel syndrome  G56.03 354.0   2. Pain in both wrists  M25.531 719.43    M25.532    3. Pain in both hands  M79.641 729.5    M79.642           Isidoro Estrella reports I am feeling about the same, still tight today.  My R is worse than my Left today      Subjective     Objective   See Exercise, Manual, and Modality Logs for complete treatment.   Forearm mobilization with cups and soft tissue work is improving.    Assessment/Plan    Cont therapy for mobilization, pain mgmt, neural mobility    Cont per POC           Timed:  Manual Therapy:    10     mins  66531;  Therapeutic Exercise:    10     mins  73209;  Therapeutic Activity:    10     mins  18436;     Neuromuscular Claudia:    0    mins  73185;    Ultrasound:     0     mins  96060;    Electrical Stimulation:    0     mins  07018;    Untimed:  Electrical Stimulation:    0     mins  10999 ( );  Fluidotherapy:        0    mins  04828  Paraffin:                          0    mins  79497    Timed Treatment:   30   mins   Total Treatment:     30   mins    OT SIGNATURE: BI Nicole, OTR/L, CHT     Electronically signed    [place] LICENSE: [id number]

## 2022-11-01 ENCOUNTER — TREATMENT (OUTPATIENT)
Dept: PHYSICAL THERAPY | Facility: CLINIC | Age: 47
End: 2022-11-01

## 2022-11-01 DIAGNOSIS — M25.531 PAIN IN BOTH WRISTS: ICD-10-CM

## 2022-11-01 DIAGNOSIS — G56.03 BILATERAL CARPAL TUNNEL SYNDROME: Primary | ICD-10-CM

## 2022-11-01 DIAGNOSIS — M25.532 PAIN IN BOTH WRISTS: ICD-10-CM

## 2022-11-01 DIAGNOSIS — M79.641 PAIN IN BOTH HANDS: ICD-10-CM

## 2022-11-01 DIAGNOSIS — M79.642 PAIN IN BOTH HANDS: ICD-10-CM

## 2022-11-01 PROCEDURE — 97140 MANUAL THERAPY 1/> REGIONS: CPT | Performed by: OCCUPATIONAL THERAPIST

## 2022-11-01 PROCEDURE — 97112 NEUROMUSCULAR REEDUCATION: CPT | Performed by: OCCUPATIONAL THERAPIST

## 2022-11-01 PROCEDURE — 97530 THERAPEUTIC ACTIVITIES: CPT | Performed by: OCCUPATIONAL THERAPIST

## 2022-11-01 NOTE — PROGRESS NOTES
Occupational Therapy Daily Treatment Note      Patient: Isidoro Estrella   : 1975  Referring practitioner: Cirilo Oneal MD  Date of Initial Visit: Type: THERAPY  Noted: 10/20/2022  Today's Date: 2022  Patient seen for 4 sessions    ICD-10-CM ICD-9-CM   1. Bilateral carpal tunnel syndrome  G56.03 354.0   2. Pain in both wrists  M25.531 719.43    M25.532    3. Pain in both hands  M79.641 729.5    M79.642           Isidoro Estrella reports I am feeling about the same 4/10 pain B.  The relief from the cupping and ASTYM lasts about a day.       Objective   See Exercise, Manual, and Modality Logs for complete treatment.   Fibrotic restrictions continue to improve B, however both forearms continue to have flexor and extensor tightness    Assessment/Plan   Have completed tool assisted STM x 4 sessions this date.  At this point relief lasts about 1 day.  Recommend continue x 4 more sessions and re-evaluate      Cont per POC           Timed:  Manual Therapy:    10     mins  79446;  Therapeutic Exercise:    0     mins  15936;  Therapeutic Activity:    10     mins  14740;     Neuromuscular Claudia:    10    mins  66552;    Ultrasound:     0     mins  69715;    Electrical Stimulation:    0     mins  57169;    Untimed:  Electrical Stimulation:    0     mins  44270 ( );  Fluidotherapy:        0    mins  83585  Paraffin:                          0    mins  76966    Timed Treatment:   30   mins   Total Treatment:     30   mins    OT SIGNATURE: BI Nicole, OTR/L, CHT     Electronically signed    [place] LICENSE: [id number]

## 2022-11-03 ENCOUNTER — TREATMENT (OUTPATIENT)
Dept: PHYSICAL THERAPY | Facility: CLINIC | Age: 47
End: 2022-11-03

## 2022-11-03 DIAGNOSIS — M79.642 PAIN IN BOTH HANDS: ICD-10-CM

## 2022-11-03 DIAGNOSIS — M25.531 PAIN IN BOTH WRISTS: ICD-10-CM

## 2022-11-03 DIAGNOSIS — M79.641 PAIN IN BOTH HANDS: ICD-10-CM

## 2022-11-03 DIAGNOSIS — M25.532 PAIN IN BOTH WRISTS: ICD-10-CM

## 2022-11-03 DIAGNOSIS — G56.03 BILATERAL CARPAL TUNNEL SYNDROME: Primary | ICD-10-CM

## 2022-11-03 PROCEDURE — 97140 MANUAL THERAPY 1/> REGIONS: CPT | Performed by: OCCUPATIONAL THERAPIST

## 2022-11-03 PROCEDURE — 97112 NEUROMUSCULAR REEDUCATION: CPT | Performed by: OCCUPATIONAL THERAPIST

## 2022-11-03 PROCEDURE — 97110 THERAPEUTIC EXERCISES: CPT | Performed by: OCCUPATIONAL THERAPIST

## 2022-11-03 NOTE — PROGRESS NOTES
Occupational Therapy Daily Treatment Note      Patient: Isidoro Estrella   : 1975  Referring practitioner: Cirilo Oneal MD  Date of Initial Visit: Type: THERAPY  Noted: 10/20/2022  Today's Date: 11/3/2022  Patient seen for 5 sessions    ICD-10-CM ICD-9-CM   1. Bilateral carpal tunnel syndrome  G56.03 354.0   2. Pain in both wrists  M25.531 719.43    M25.532    3. Pain in both hands  M79.641 729.5    M79.642           Isidoro Estrella reports the last 2 days I have been getting ashwini horses in my back when I am doing exercises.  The numbness is not as bad on the R side today.   I was able to draw some this weekend without cramping.       Objective   See Exercise, Manual, and Modality Logs for complete treatment.   Pt having paraspinal cramping with exercises, added thoracic extension for mobilization    Assessment/Plan  Fibrotic restrictions in volar and dorsal forearm are improving.  Pt continues to have neural compression of L worse than right.       Cont per POC           Timed:  Manual Therapy:    10     mins  51172;  Therapeutic Exercise:    10     mins  73703;  Therapeutic Activity:    0     mins  16005;     Neuromuscular Claudia:    10    mins  32553;    Ultrasound:     0     mins  02289;    Electrical Stimulation:    0     mins  90587;    Untimed:  Electrical Stimulation:    0     mins  70445 ( );  Fluidotherapy:        0    mins  45827  Paraffin:                          0    mins  94077    Timed Treatment:   30   mins   Total Treatment:     30   mins    OT SIGNATURE: BI Nicole, OTR/L, CHT     Electronically signed    [place] LICENSE: [id number]

## 2022-11-08 ENCOUNTER — TREATMENT (OUTPATIENT)
Dept: PHYSICAL THERAPY | Facility: CLINIC | Age: 47
End: 2022-11-08

## 2022-11-08 DIAGNOSIS — M79.641 PAIN IN BOTH HANDS: ICD-10-CM

## 2022-11-08 DIAGNOSIS — M25.531 PAIN IN BOTH WRISTS: ICD-10-CM

## 2022-11-08 DIAGNOSIS — M25.532 PAIN IN BOTH WRISTS: ICD-10-CM

## 2022-11-08 DIAGNOSIS — G56.03 BILATERAL CARPAL TUNNEL SYNDROME: Primary | ICD-10-CM

## 2022-11-08 DIAGNOSIS — M79.642 PAIN IN BOTH HANDS: ICD-10-CM

## 2022-11-08 PROCEDURE — 97112 NEUROMUSCULAR REEDUCATION: CPT | Performed by: OCCUPATIONAL THERAPIST

## 2022-11-08 PROCEDURE — 97110 THERAPEUTIC EXERCISES: CPT | Performed by: OCCUPATIONAL THERAPIST

## 2022-11-08 NOTE — PROGRESS NOTES
Occupational Therapy Daily Treatment Note      Patient: Isidoro Estrella   : 1975  Referring practitioner: Cirilo Oneal MD  Date of Initial Visit: Type: THERAPY  Noted: 10/20/2022  Today's Date: 2022  Patient seen for 6 sessions    ICD-10-CM ICD-9-CM   1. Bilateral carpal tunnel syndrome  G56.03 354.0   2. Pain in both wrists  M25.531 719.43    M25.532    3. Pain in both hands  M79.641 729.5    M79.642           Isidoro Estrella reports I am feeling about the same.        Objective   See Exercise, Manual, and Modality Logs for complete treatment.   Fascial tightness is improving in B dorsal and volar forearms.  AROM is also improving.  Increased neural stretch with nerve glides to add neck stretches to B median nerve glides with increased stretch noted in forearm.     Assessment/Plan  Pt to progress neural glides and stretches with HEP.    Cont per POC           Timed:  Manual Therapy:    10     mins  97667;  Therapeutic Exercise:    10     mins  67908;  Therapeutic Activity:    0     mins  94041;     Neuromuscular Claudia:    10    mins  72723;    Ultrasound:     0     mins  68802;    Electrical Stimulation:    0     mins  69557;    Untimed:  Electrical Stimulation:    0     mins  21301 ( );  Fluidotherapy:        0    mins  86081  Paraffin:                          0    mins  98614    Timed Treatment:   30   mins   Total Treatment:     30   mins    OT SIGNATURE: BI Nicole, OTR/L, CHT     Electronically signed    [place] LICENSE: [id number]

## 2022-11-10 ENCOUNTER — TELEPHONE (OUTPATIENT)
Dept: PHYSICAL THERAPY | Facility: CLINIC | Age: 47
End: 2022-11-10

## 2022-11-14 ENCOUNTER — TREATMENT (OUTPATIENT)
Dept: PHYSICAL THERAPY | Facility: CLINIC | Age: 47
End: 2022-11-14

## 2022-11-14 DIAGNOSIS — M25.532 PAIN IN BOTH WRISTS: ICD-10-CM

## 2022-11-14 DIAGNOSIS — M79.641 PAIN IN BOTH HANDS: ICD-10-CM

## 2022-11-14 DIAGNOSIS — M79.642 PAIN IN BOTH HANDS: ICD-10-CM

## 2022-11-14 DIAGNOSIS — M25.531 PAIN IN BOTH WRISTS: ICD-10-CM

## 2022-11-14 DIAGNOSIS — G56.03 BILATERAL CARPAL TUNNEL SYNDROME: Primary | ICD-10-CM

## 2022-11-14 PROCEDURE — 97110 THERAPEUTIC EXERCISES: CPT | Performed by: OCCUPATIONAL THERAPIST

## 2022-11-14 PROCEDURE — 97112 NEUROMUSCULAR REEDUCATION: CPT | Performed by: OCCUPATIONAL THERAPIST

## 2022-11-14 NOTE — PROGRESS NOTES
Occupational Therapy Daily Treatment Note      Patient: Isidoro Estrella   : 1975  Referring practitioner: Cirilo Oneal MD  Date of Initial Visit: Type: THERAPY  Noted: 10/20/2022  Today's Date: 2022  Patient seen for 7 sessions    ICD-10-CM ICD-9-CM   1. Bilateral carpal tunnel syndrome  G56.03 354.0   2. Pain in both wrists  M25.531 719.43    M25.532    3. Pain in both hands  M79.641 729.5    M79.642           Isidoro Estrella reports it is about the same.  4/10      Objective   See Exercise, Manual, and Modality Logs for complete treatment.   Pt is performing exercises with minimal cues, continues to have stiffness and pain.    Assessment/Plan    Pt has had limited improvements with pain, tightness is improving in midforearms.  Not as tight with flexor stretch    Cont per POC           Timed:  Manual Therapy:    10     mins  61899;  Therapeutic Exercise:    10     mins  42683;  Therapeutic Activity:    0     mins  30557;     Neuromuscular Claudia:    10    mins  74713;    Ultrasound:     0     mins  26691;    Electrical Stimulation:    0     mins  88559;    Untimed:  Electrical Stimulation:    0     mins  93312 ( );  Fluidotherapy:        0    mins  46733  Paraffin:                          0    mins  08585    Timed Treatment:   30   mins   Total Treatment:     30   mins    OT SIGNATURE: BI Nicole, OTR/L, CHT     Electronically signed    [place] LICENSE: [id number]

## 2022-11-15 ENCOUNTER — TELEPHONE (OUTPATIENT)
Dept: INTERNAL MEDICINE | Facility: CLINIC | Age: 47
End: 2022-11-15

## 2022-11-17 ENCOUNTER — TREATMENT (OUTPATIENT)
Dept: PHYSICAL THERAPY | Facility: CLINIC | Age: 47
End: 2022-11-17

## 2022-11-17 DIAGNOSIS — M25.531 PAIN IN BOTH WRISTS: ICD-10-CM

## 2022-11-17 DIAGNOSIS — M79.641 PAIN IN BOTH HANDS: ICD-10-CM

## 2022-11-17 DIAGNOSIS — G56.03 BILATERAL CARPAL TUNNEL SYNDROME: Primary | ICD-10-CM

## 2022-11-17 DIAGNOSIS — M79.642 PAIN IN BOTH HANDS: ICD-10-CM

## 2022-11-17 DIAGNOSIS — M25.532 PAIN IN BOTH WRISTS: ICD-10-CM

## 2022-11-17 PROCEDURE — 97110 THERAPEUTIC EXERCISES: CPT | Performed by: OCCUPATIONAL THERAPIST

## 2022-11-17 PROCEDURE — 97112 NEUROMUSCULAR REEDUCATION: CPT | Performed by: OCCUPATIONAL THERAPIST

## 2022-11-17 NOTE — PROGRESS NOTES
Occupational Therapy Daily Treatment Note      Patient: Isidoro Estrella   : 1975  Referring practitioner: Cirilo Oneal MD  Date of Initial Visit: Type: THERAPY  Noted: 10/20/2022  Today's Date: 2022  Patient seen for 8 sessions    ICD-10-CM ICD-9-CM   1. Bilateral carpal tunnel syndrome  G56.03 354.0   2. Pain in both wrists  M25.531 719.43    M25.532    3. Pain in both hands  M79.641 729.5    M79.642           Isidoro Estrella reports I feel about the same today. 4/10 pain in B hands this date.       Objective   See Exercise, Manual, and Modality Logs for complete treatment.   Thoracic extension is improving this date.  Pt continues to have stiffness and     Assessment/Plan    Pt continues to have volar and dorsal tenderness in B forearms. Tightness and restricts movement in wrist and digits.     Cont per POC           Timed:  Manual Therapy:    10     mins  72198;  Therapeutic Exercise:    10     mins  24675;  Therapeutic Activity:    0     mins  61166;     Neuromuscular Claudia:    10    mins  05072;    Ultrasound:     0     mins  88657;    Electrical Stimulation:    0     mins  50755;    Untimed:  Electrical Stimulation:    0     mins  18443 ( );  Fluidotherapy:        0    mins  60603  Paraffin:                          0    mins  24282    Timed Treatment:   30   mins   Total Treatment:     30   mins    OT SIGNATURE: BI Nicole, OTR/L, CHT     Electronically signed    [place] LICENSE: [id number]

## 2022-11-21 ENCOUNTER — TREATMENT (OUTPATIENT)
Dept: PHYSICAL THERAPY | Facility: CLINIC | Age: 47
End: 2022-11-21

## 2022-11-21 ENCOUNTER — TRANSCRIBE ORDERS (OUTPATIENT)
Dept: PHYSICAL THERAPY | Facility: CLINIC | Age: 47
End: 2022-11-21

## 2022-11-21 DIAGNOSIS — G56.03 BILATERAL CARPAL TUNNEL SYNDROME: Primary | ICD-10-CM

## 2022-11-21 DIAGNOSIS — M79.641 PAIN IN BOTH HANDS: ICD-10-CM

## 2022-11-21 DIAGNOSIS — M54.2 PAIN, NECK: Primary | ICD-10-CM

## 2022-11-21 DIAGNOSIS — M25.531 PAIN IN BOTH WRISTS: ICD-10-CM

## 2022-11-21 DIAGNOSIS — R20.0 NUMBNESS AND TINGLING IN BOTH HANDS: ICD-10-CM

## 2022-11-21 DIAGNOSIS — M25.532 PAIN IN BOTH WRISTS: ICD-10-CM

## 2022-11-21 DIAGNOSIS — R20.2 NUMBNESS AND TINGLING IN BOTH HANDS: ICD-10-CM

## 2022-11-21 DIAGNOSIS — M79.642 PAIN IN BOTH HANDS: ICD-10-CM

## 2022-11-21 PROCEDURE — 97112 NEUROMUSCULAR REEDUCATION: CPT | Performed by: OCCUPATIONAL THERAPIST

## 2022-11-21 PROCEDURE — 97110 THERAPEUTIC EXERCISES: CPT | Performed by: OCCUPATIONAL THERAPIST

## 2022-11-21 NOTE — PROGRESS NOTES
Re-Assessment / Re-Certification  Occupational Therapy  [address] [place] [id number]      Patient: Isidoro Estrella   : 1975  Diagnosis/ICD-10 Code:  Bilateral carpal tunnel syndrome [G56.03]  Referring practitioner: Cirilo Oneal MD  Date of Initial Visit: Type: THERAPY  Noted: 10/20/2022  Today's Date: 2022  Patient seen for 9 sessions      Subjective:   Isidoro Estrella reports:  I have had a busy weekend, on the way to work my R hand was really painful.  4/10      Subjective Questionnaire: QuickDASH: 19  Clinical Progress: unchanged  Home Program Compliance: Yes  Treatment has included: therapeutic exercise, neuromuscular re-education, manual therapy and therapeutic activity    Subjective   Objective .  Wrist/Hand   Left   Diminished: light touch     Right   Diminished: light touch     Comments   Left light touch: Th 3.61; 2.83 SW   Right light touch: R 3.61; all other 2.83     Active Range of Motion      Left Wrist   Wrist flexion: 70 degrees   Wrist extension: 60 degrees      Right Wrist   Wrist flexion: 80 degrees   Wrist extension: 60 degrees     Additional Active Range of Motion Details  Full fist B       Strength/Myotome Testing      Left Wrist/Hand       (2nd hand position)   Left  strength (2nd hand position) 110 lbs     Right Wrist/Hand       (2nd hand position)   Right  strength (2nd hand position) 95 lbs     Assessment/Plan    Visit Diagnoses:    ICD-10-CM ICD-9-CM   1. Bilateral carpal tunnel syndrome  G56.03 354.0   2. Pain in both wrists  M25.531 719.43    M25.532    3. Pain in both hands  M79.641 729.5    M79.642        Progress toward previous goals: Not Met    Plan Goals: 1. The patient complains of pain in the B hand pain                  LTG 1: 12 weeks:  The patient will report a pain rating of 0/10 or better in order to improve sleep quality and tolerance to performance of activities of daily living.                                  STATUS:  NOT  MET                  STG 1a: 4weeks:  The patient will report a pain rating of 3/10 or better.                                   STATUS:  NOT MET  2. The patient has limited ROM of the R wrist ext                        LTG 2: 12 weeks:  The patient will demonstrate 60 degrees of R wrist ext to allow the patient to WB.                                  STATUS:  MET                  STG 2a: 4 weeks:  The patient will demonstrate 50 degrees of R wrist ext.                                  STATUS:  MET                           3. The patient has limited strength of the R hand.                  LTG 3: 12 weeks:  The patient will demonstrate 90# in order to  In R hand.                                  STATUS:  MET                  STG 3a: 4 weeks:  The patient will demonstrate tolerance of strengthening without numbness and tingling.                                  STATUS:  NOT MET  4. Carrying, Moving, and Handling Objects Functional Limitation                                   LTG 4: 12 weeks:  The patient will demonstrate 0% limitation by achieving a score of 11 on the Quick DASH.                                  STATUS:  NOT MET                  STG 4a: 4 weeks:  The patient will demonstrate 1-19% limitation by achieving a score of 13 on the Quick DASH.                                    STATUS:  NOT MET      Recommendations: Discharge due to lack of progress, recommend PT for cervical assessment, pt is scheduled 12/15/22   Timeframe: 11/21/22  Prognosis to achieve goals: fair      OT SIGNATURE: BI Nicole, OTR/L, CHT     Electronically signed    [place] LICENSE: [id number]         Timed:  Manual Therapy:    10     mins  75927;  Therapeutic Exercise:    10     mins  04373;  Therapeutic Activity:    0     mins  56057;     Neuromuscular Claudia:    10    mins  04608;    Ultrasound:     0     mins  44498;    Electrical Stimulation:    0     mins  94713;    Untimed:  Electrical Stimulation:    0     mins  27555 (  );  Fluidotherapy:        0    mins  02094  Paraffin:                          0    mins  31923    Timed Treatment:   30   mins   Total Treatment:     30   mins

## 2023-01-04 ENCOUNTER — TELEPHONE (OUTPATIENT)
Dept: SURGERY | Facility: CLINIC | Age: 48
End: 2023-01-04

## 2023-01-04 NOTE — TELEPHONE ENCOUNTER
Caller: Isidoro Estrella    Relationship: Self    Best call back number: [phone]    What is the best time to reach you: PT STATED THEY COULD BE REACHED BETWEEN: 2:30 - 5 - PT STATED  OKAY TO LVM    Who are you requesting to speak with (clinical staff, provider,  specific staff member): CLINICAL - SURGERY SCHEDULING      What was the call regarding: PT STATED THEY NEEDED TO RESCHEDULE THEIR COLONOSCOPY DUE TO INSURANCE NOT COVERING THE CURRENTLY SCHEDULED PROCEDURE.    Do you require a callback: YES

## 2023-01-16 ENCOUNTER — TELEPHONE (OUTPATIENT)
Dept: INTERNAL MEDICINE | Facility: CLINIC | Age: 48
End: 2023-01-16
Payer: COMMERCIAL

## 2023-03-03 ENCOUNTER — OFFICE VISIT (OUTPATIENT)
Dept: INTERNAL MEDICINE | Facility: CLINIC | Age: 48
End: 2023-03-03
Payer: COMMERCIAL

## 2023-03-03 VITALS
HEART RATE: 108 BPM | OXYGEN SATURATION: 96 % | TEMPERATURE: 98.4 F | WEIGHT: 204.4 LBS | DIASTOLIC BLOOD PRESSURE: 98 MMHG | BODY MASS INDEX: 34.05 KG/M2 | HEIGHT: 65 IN | SYSTOLIC BLOOD PRESSURE: 164 MMHG

## 2023-03-03 DIAGNOSIS — R73.03 PREDIABETES: ICD-10-CM

## 2023-03-03 DIAGNOSIS — E66.09 CLASS 1 OBESITY DUE TO EXCESS CALORIES WITHOUT SERIOUS COMORBIDITY WITH BODY MASS INDEX (BMI) OF 34.0 TO 34.9 IN ADULT: ICD-10-CM

## 2023-03-03 DIAGNOSIS — G56.03 BILATERAL CARPAL TUNNEL SYNDROME: ICD-10-CM

## 2023-03-03 DIAGNOSIS — I10 ESSENTIAL HYPERTENSION: ICD-10-CM

## 2023-03-03 DIAGNOSIS — Z02.89 ENCOUNTER FOR PHYSICAL EXAMINATION RELATED TO EMPLOYMENT: Primary | ICD-10-CM

## 2023-03-03 DIAGNOSIS — E55.9 VITAMIN D DEFICIENCY: ICD-10-CM

## 2023-03-03 LAB
BASOPHILS # BLD AUTO: 0.08 10*3/MM3 (ref 0–0.2)
BASOPHILS NFR BLD AUTO: 1.3 % (ref 0–1.5)
CHOLEST SERPL-MCNC: 214 MG/DL (ref 0–200)
DEPRECATED RDW RBC AUTO: 39.7 FL (ref 37–54)
EOSINOPHIL # BLD AUTO: 0.18 10*3/MM3 (ref 0–0.4)
EOSINOPHIL NFR BLD AUTO: 2.9 % (ref 0.3–6.2)
ERYTHROCYTE [DISTWIDTH] IN BLOOD BY AUTOMATED COUNT: 12.8 % (ref 12.3–15.4)
HBA1C MFR BLD: 5.8 % (ref 4.8–5.6)
HCT VFR BLD AUTO: 45.8 % (ref 37.5–51)
HDLC SERPL-MCNC: 43 MG/DL (ref 40–60)
HGB BLD-MCNC: 15.6 G/DL (ref 13–17.7)
IMM GRANULOCYTES # BLD AUTO: 0.01 10*3/MM3 (ref 0–0.05)
IMM GRANULOCYTES NFR BLD AUTO: 0.2 % (ref 0–0.5)
LDLC SERPL CALC-MCNC: 121 MG/DL (ref 0–100)
LDLC/HDLC SERPL: 2.67 {RATIO}
LYMPHOCYTES # BLD AUTO: 1.61 10*3/MM3 (ref 0.7–3.1)
LYMPHOCYTES NFR BLD AUTO: 26.1 % (ref 19.6–45.3)
MCH RBC QN AUTO: 29.2 PG (ref 26.6–33)
MCHC RBC AUTO-ENTMCNC: 34.1 G/DL (ref 31.5–35.7)
MCV RBC AUTO: 85.8 FL (ref 79–97)
MONOCYTES # BLD AUTO: 0.63 10*3/MM3 (ref 0.1–0.9)
MONOCYTES NFR BLD AUTO: 10.2 % (ref 5–12)
NEUTROPHILS NFR BLD AUTO: 3.65 10*3/MM3 (ref 1.7–7)
NEUTROPHILS NFR BLD AUTO: 59.3 % (ref 42.7–76)
NRBC BLD AUTO-RTO: 0 /100 WBC (ref 0–0.2)
PLATELET # BLD AUTO: 237 10*3/MM3 (ref 140–450)
PMV BLD AUTO: 11.5 FL (ref 6–12)
RBC # BLD AUTO: 5.34 10*6/MM3 (ref 4.14–5.8)
TRIGL SERPL-MCNC: 282 MG/DL (ref 0–150)
TSH SERPL DL<=0.05 MIU/L-ACNC: 0.49 UIU/ML (ref 0.27–4.2)
VLDLC SERPL-MCNC: 50 MG/DL (ref 5–40)
WBC NRBC COR # BLD: 6.16 10*3/MM3 (ref 3.4–10.8)

## 2023-03-03 PROCEDURE — 99080 SPECIAL REPORTS OR FORMS: CPT | Performed by: STUDENT IN AN ORGANIZED HEALTH CARE EDUCATION/TRAINING PROGRAM

## 2023-03-03 PROCEDURE — 80053 COMPREHEN METABOLIC PANEL: CPT | Performed by: STUDENT IN AN ORGANIZED HEALTH CARE EDUCATION/TRAINING PROGRAM

## 2023-03-03 PROCEDURE — 83036 HEMOGLOBIN GLYCOSYLATED A1C: CPT | Performed by: STUDENT IN AN ORGANIZED HEALTH CARE EDUCATION/TRAINING PROGRAM

## 2023-03-03 PROCEDURE — 85025 COMPLETE CBC W/AUTO DIFF WBC: CPT | Performed by: STUDENT IN AN ORGANIZED HEALTH CARE EDUCATION/TRAINING PROGRAM

## 2023-03-03 PROCEDURE — 84443 ASSAY THYROID STIM HORMONE: CPT | Performed by: STUDENT IN AN ORGANIZED HEALTH CARE EDUCATION/TRAINING PROGRAM

## 2023-03-03 PROCEDURE — 99214 OFFICE O/P EST MOD 30 MIN: CPT | Performed by: STUDENT IN AN ORGANIZED HEALTH CARE EDUCATION/TRAINING PROGRAM

## 2023-03-03 PROCEDURE — 80061 LIPID PANEL: CPT | Performed by: STUDENT IN AN ORGANIZED HEALTH CARE EDUCATION/TRAINING PROGRAM

## 2023-03-03 RX ORDER — AMLODIPINE BESYLATE 10 MG/1
10 TABLET ORAL DAILY
Qty: 90 TABLET | Refills: 3 | Status: SHIPPED | OUTPATIENT
Start: 2023-03-03

## 2023-03-03 NOTE — PROGRESS NOTES
Chief Complaint  Annual Exam (Work physical), Hypertension    Subjective          Isidoro Estrella presents to [facility]  History of Present Illness    Here for employment physical and paperwork.    Reports was just at a  today (grandmother in law passed away).    Has been off his BP meds for awhile.    Still has some tingling in left hand, but overall carpal tunnel is doing much better.      PHQ-9 Depression Screening  Little interest or pleasure in doing things? 0-->not at all   Feeling down, depressed, or hopeless? 0-->not at all   Trouble falling or staying asleep, or sleeping too much?     Feeling tired or having little energy?     Poor appetite or overeating?     Feeling bad about yourself - or that you are a failure or have let yourself or your family down?     Trouble concentrating on things, such as reading the newspaper or watching television?     Moving or speaking so slowly that other people could have noticed? Or the opposite - being so fidgety or restless that you have been moving around a lot more than usual?     Thoughts that you would be better off dead, or of hurting yourself in some way?     PHQ-9 Total Score 0   If you checked off any problems, how difficult have these problems made it for you to do your work, take care of things at home, or get along with other people?             Current Outpatient Medications   Medication Instructions   • amLODIPine (NORVASC) 10 mg, Oral, Daily   • cetirizine (ZYRTEC) 10 mg, Oral, Daily   • Cholecalciferol 2,000 Units, Oral, Daily   • Elastic Bandages & Supports (Wrist Brace Deluxe) misc 1 Units, Does not apply, Nightly   • polyethylene glycol (MIRALAX) 17 GM/SCOOP powder See Admin Instructions   • sodium-potassium-magnesium sulfates (Suprep Bowel Prep Kit) 17.5-3.13-1.6 GM/177ML solution oral solution Take as directed.  Instructions given in office.  Dispense: 2 bottles       The following portions of the  "patient's history were reviewed and updated as appropriate: allergies, current medications, past family history, past medical history, past social history, past surgical history, and problem list.    Objective   Vital Signs:   /98   Pulse 108   Temp 98.4 °F (36.9 °C) (Temporal)   Ht 165.1 cm (65\")   Wt 92.7 kg (204 lb 6.4 oz)   SpO2 96%   BMI 34.01 kg/m²     Wt Readings from Last 3 Encounters:   03/03/23 92.7 kg (204 lb 6.4 oz)   09/28/22 92.6 kg (204 lb 3.2 oz)   09/13/22 91.6 kg (202 lb)     BP Readings from Last 3 Encounters:   03/03/23 164/98   09/28/22 124/84   09/06/22 121/92     Physical Exam  Vitals reviewed.   Constitutional:       General: He is not in acute distress.     Appearance: Normal appearance. He is not ill-appearing, toxic-appearing or diaphoretic.   HENT:      Head: Normocephalic and atraumatic.      Right Ear: External ear normal.      Left Ear: External ear normal.   Eyes:      Conjunctiva/sclera: Conjunctivae normal.   Cardiovascular:      Rate and Rhythm: Normal rate and regular rhythm.      Pulses: Normal pulses.      Heart sounds: Normal heart sounds. No murmur heard.    No friction rub. No gallop.   Pulmonary:      Effort: Pulmonary effort is normal. No respiratory distress.      Breath sounds: Normal breath sounds. No stridor. No wheezing, rhonchi or rales.   Chest:      Chest wall: No tenderness.   Abdominal:      General: Abdomen is flat.      Palpations: Abdomen is soft. There is no mass.      Tenderness: There is no abdominal tenderness.   Musculoskeletal:      Right lower leg: No edema.      Left lower leg: No edema.   Skin:     General: Skin is warm and dry.   Neurological:      General: No focal deficit present.      Mental Status: He is alert. Mental status is at baseline.   Psychiatric:         Mood and Affect: Mood normal.         Behavior: Behavior normal.         Thought Content: Thought content normal.         Judgment: Judgment normal.       Result Review : "   The following data was reviewed by: Cirilo Oneal MD on 03/03/2023:  Common labs    Common Labs 6/27/22 6/27/22 6/27/22 6/27/22 9/28/22 9/28/22    0954 0954 0954 0954 1005 1005   Glucose   104 (A)   101 (A)   BUN   18   13   Creatinine   1.13   0.99   Sodium   139   137   Potassium   4.0   3.8   Chloride   101   100   Calcium   10.4   9.4   Albumin   4.40   4.30   Total Bilirubin   0.5   0.4   Alkaline Phosphatase   73   85   AST (SGOT)   23   31   ALT (SGPT)   30   49 (A)   WBC 5.46        Hemoglobin 15.7        Hematocrit 48.4        Platelets 227        Total Cholesterol    212 (A)     Triglycerides    192 (A)     HDL Cholesterol    41     LDL Cholesterol     136 (A)     Hemoglobin A1C  6.00 (A)   5.70 (A)    (A) Abnormal value                   Lab Results   Component Value Date    BILIRUBINUR Negative 06/27/2022       Procedures        Assessment and Plan    Diagnoses and all orders for this visit:    1. Encounter for physical examination related to employment (Primary)  -     CBC & Differential  -     Comprehensive Metabolic Panel  -     TSH  -     Lipid Panel  -     Hemoglobin A1c    2. Vitamin D deficiency    3. Prediabetes  -     Comprehensive Metabolic Panel  -     Hemoglobin A1c    4. Essential hypertension  -     Comprehensive Metabolic Panel  -     amLODIPine (NORVASC) 10 MG tablet; Take 1 tablet by mouth Daily.  Dispense: 90 tablet; Refill: 3    5. Class 1 obesity due to excess calories without serious comorbidity with body mass index (BMI) of 34.0 to 34.9 in adult    6. Bilateral carpal tunnel syndrome      Employment Exam:  -paperwork filled out today    HTN:  -BP above goal, will re-start amlodipine  -low sodium dietary counseling    Bilateral carpal tunnel:  -improved    Health Maintenance:  -nutritional counseling on the components of a heart healthy diet  -exercise counseling, recommending at least 2.5 hours of moderate exercise weekly      Medications Discontinued During This Encounter    Medication Reason   • amLODIPine (NORVASC) 10 MG tablet Reorder          Follow Up   Return in about 3 months (around 6/3/2023) for HTN.  Patient was given instructions and counseling regarding his condition or for health maintenance advice. Please see specific information pulled into the AVS if appropriate.       Cirilo Oneal MD  03/03/23  17:20 EST

## 2023-03-03 NOTE — PATIENT INSTRUCTIONS
Cooking With Less Salt  Cooking with less salt is one way to reduce the amount of sodium you get from food. Sodium is one of the elements that make up salt. It is found naturally in foods and is also added to certain foods. Depending on your condition and overall health, your health care provider or dietitian may recommend that you reduce your sodium intake. Most people should have less than 2,300 milligrams (mg) of sodium each day. If you have high blood pressure (hypertension), you may need to limit your sodium to 1,500 mg each day. Follow the tips below to help reduce your sodium intake.  What are tips for eating less sodium?  Reading food labels       Check the food label before buying or using packaged ingredients. Always check the label for the serving size and sodium content.  Look for products with no more than 140 mg of sodium in one serving.  Check the % Daily Value column to see what percent of the daily recommended amount of sodium is provided in one serving of the product. Foods with 5% or less in this column are considered low in sodium. Foods with 20% or higher are considered high in sodium.  Do not choose foods with salt as one of the first three ingredients on the ingredients list. If salt is one of the first three ingredients, it usually means the item is high in sodium.     Shopping  Buy sodium-free or low-sodium products. Look for the following words on food labels:  Low-sodium.  Sodium-free.  Reduced-sodium.  No salt added.  Unsalted.  Always check the sodium content even if foods are labeled as low-sodium or no salt added.  Buy fresh foods.  Cooking  Use herbs, seasonings without salt, and spices as substitutes for salt.  Use sodium-free baking soda when baking.  [place], braise, or roast foods to add flavor with less salt.  Avoid adding salt to pasta, rice, or hot cereals.  Drain and rinse canned vegetables, beans, and meat before use.  Avoid adding salt when cooking sweets and desserts.  Cook  "with low-sodium ingredients.  What foods are high in sodium?  Vegetables  Regular canned vegetables (not low-sodium or reduced-sodium). Sauerkraut, pickled vegetables, and relishes. Olives. French fries. Onion rings. Regular canned tomato sauce and paste. Regular tomato and vegetable juice. Frozen vegetables in sauces.  Grains  Instant hot cereals. Bread stuffing, pancake, and biscuit mixes. Croutons. Seasoned rice or pasta mixes. Noodle soup cups. Boxed or frozen macaroni and cheese. Regular salted crackers. Self-rising flour. Rolls. Bagels. Flour tortillas and wraps.  Meats and other proteins  Meat or fish that is salted, canned, smoked, cured, spiced, or pickled. This includes camacho, ham, sausages, hot dogs, corned beef, chipped beef, meat loaves, salt pork, jerky, pickled herring, anchovies, regular canned tuna, and sardines. Salted nuts.  Dairy  Processed cheese and cheese spreads. Cheese curds. Blue cheese. Feta cheese. String cheese. Regular cottage cheese. Buttermilk. Canned milk.  The items listed above may not be a complete list of foods high in sodium. Actual amounts of sodium may be different depending on processing. Contact a dietitian for more information.  What foods are low in sodium?  Fruits  Fresh, frozen, or canned fruit with no sauce added. Fruit juice.  Vegetables  Fresh or frozen vegetables with no sauce added. \"No salt added\" canned vegetables. \"No salt added\" tomato sauce and paste. Low-sodium or reduced-sodium tomato and vegetable juice.  Grains  Noodles, pasta, quinoa, rice. Shredded or puffed wheat or puffed rice. Regular or quick oats (not instant). Low-sodium crackers. Low-sodium bread. Whole-grain bread and whole-grain pasta. Unsalted popcorn.  Meats and other proteins  Fresh or frozen whole meats, poultry (not injected with sodium), and fish with no sauce added. Unsalted nuts. Dried peas, beans, and lentils without added salt. Unsalted canned beans. Eggs. Unsalted nut butters. " Low-sodium canned tuna or chicken.  Dairy  Milk. Soy milk. Yogurt. Low-sodium cheeses, such as Swiss, [place], mozzarella, and ricotta. Sherbet or ice cream (keep to ½ cup per serving). Cream cheese.  Fats and oils  Unsalted butter or margarine.  Other foods  Homemade pudding. Sodium-free baking soda and baking powder. Herbs and spices. Low-sodium seasoning mixes.  Beverages  Coffee and tea. Carbonated beverages.  The items listed above may not be a complete list of foods low in sodium. Actual amounts of sodium may be different depending on processing. Contact a dietitian for more information.  What are some salt alternatives when cooking?  The following are herbs, seasonings, and spices that can be used instead of salt to flavor your food. Herbs should be fresh or dried. Do not choose packaged mixes. Next to the name of the herb, spice, or seasoning are some examples of foods you can pair it with.  Herbs  Bay leaves - Soups, meat and vegetable dishes, and spaghetti sauce.  Basil - Italian dishes, soups, pasta, and fish dishes.  Cilantro - Meat, poultry, and vegetable dishes.  Chili powder - Marinades and Mexican dishes.  Chives - Salad dressings and potato dishes.  Cumin - Mexican dishes, couscous, and meat dishes.  Dill - Fish dishes, sauces, and salads.  Fennel - Meat and vegetable dishes, breads, and cookies.  Garlic (do not use garlic salt) - Italian dishes, meat dishes, salad dressings, and sauces.  Marjoram - Soups, potato dishes, and meat dishes.  Oregano - Pizza and spaghetti sauce.  Parsley - Salads, soups, pasta, and meat dishes.  Carina - Italian dishes, salad dressings, soups, and red meats.  Saffron - Fish dishes, pasta, and some poultry dishes.  Branden - Stuffings and sauces.  Tarragon - Fish and poultry dishes.  Thyme - Stuffing, meat, and fish dishes.  Seasonings  Lemon juice - Fish dishes, poultry dishes, vegetables, and salads.  Vinegar - Salad dressings, vegetables, and fish  "dishes.  Spices  Cinnamon - Sweet dishes, such as cakes, cookies, and puddings.  Cloves - Gingerbread, puddings, and marinades for meats.  Coelho - Vegetable dishes, fish and poultry dishes, and stir-miramontes dishes.  Joyce - Vegetable dishes, fish dishes, and stir-miramontes dishes.  Nutmeg - Pasta, vegetables, poultry, fish dishes, and custard.  Summary  Cooking with less salt is one way to reduce the amount of sodium that you get from food.  Buy sodium-free or low-sodium products.  Check the food label before using or buying packaged ingredients.  Use herbs, seasonings without salt, and spices as substitutes for salt in foods.  This information is not intended to replace advice given to you by your health care provider. Make sure you discuss any questions you have with your health care provider.  Document Revised: 12/09/2020 Document Reviewed: 12/09/2020  [employer] Patient Education © 2021 [employer]      https://www.nhlbi.nih.gov/files/docs/public/heart/dash_brief.pdf\">   DASH Eating Plan  DASH stands for Dietary Approaches to Stop Hypertension. The DASH eating plan is a healthy eating plan that has been shown to:  Reduce high blood pressure (hypertension).  Reduce your risk for type 2 diabetes, heart disease, and stroke.  Help with weight loss.  What are tips for following this plan?  Reading food labels  Check food labels for the amount of salt (sodium) per serving. Choose foods with less than 5 percent of the Daily Value of sodium. Generally, foods with less than 300 milligrams (mg) of sodium per serving fit into this eating plan.  To find whole grains, look for the word \"whole\" as the first word in the ingredient list.  Shopping  Buy products labeled as \"low-sodium\" or \"no salt added.\"  Buy fresh foods. Avoid canned foods and pre-made or frozen meals.  Cooking  Avoid adding salt when cooking. Use salt-free seasonings or herbs instead of table salt or sea salt. Check with your health care provider or pharmacist before " using salt substitutes.  Do not miramontes foods. Cook foods using healthy methods such as baking, boiling, grilling, roasting, and broiling instead.  Cook with heart-healthy oils, such as olive, canola, avocado, soybean, or sunflower oil.  Meal planning       Eat a balanced diet that includes:  4 or more servings of fruits and 4 or more servings of vegetables each day. Try to fill one-half of your plate with fruits and vegetables.  6-8 servings of whole grains each day.  Less than 6 oz (170 g) of lean meat, poultry, or fish each day. A 3-oz (85-g) serving of meat is about the same size as a deck of cards. One egg equals 1 oz (28 g).  2-3 servings of low-fat dairy each day. One serving is 1 cup (237 mL).  1 serving of nuts, seeds, or beans 5 times each week.  2-3 servings of heart-healthy fats. Healthy fats called omega-3 fatty acids are found in foods such as walnuts, flaxseeds, fortified milks, and eggs. These fats are also found in cold-water fish, such as sardines, salmon, and mackerel.  Limit how much you eat of:  Canned or prepackaged foods.  Food that is high in trans fat, such as some fried foods.  Food that is high in saturated fat, such as fatty meat.  Desserts and other sweets, sugary drinks, and other foods with added sugar.  Full-fat dairy products.  Do not salt foods before eating.  Do not eat more than 4 egg yolks a week.  Try to eat at least 2 vegetarian meals a week.  Eat more home-cooked food and less restaurant, buffet, and fast food.     Lifestyle  When eating at a restaurant, ask that your food be prepared with less salt or no salt, if possible.  If you drink alcohol:  Limit how much you use to:  0-1 drink a day for women who are not pregnant.  0-2 drinks a day for men.  Be aware of how much alcohol is in your drink. In the [place] one drink equals one 12 oz bottle of beer (355 mL), one 5 oz glass of wine (148 mL), or one 1½ oz glass of hard liquor (44 mL).  General information  Avoid eating more than  2,300 mg of salt a day. If you have hypertension, you may need to reduce your sodium intake to 1,500 mg a day.  Work with your health care provider to maintain a healthy body weight or to lose weight. Ask what an ideal weight is for you.  Get at least 30 minutes of exercise that causes your heart to beat faster (aerobic exercise) most days of the week. Activities may include walking, swimming, or biking.  Work with your health care provider or dietitian to adjust your eating plan to your individual calorie needs.  What foods should I eat?  Fruits  All fresh, dried, or frozen fruit. Canned fruit in natural juice (without added sugar).  Vegetables  Fresh or frozen vegetables (raw, steamed, roasted, or grilled). Low-sodium or reduced-sodium tomato and vegetable juice. Low-sodium or reduced-sodium tomato sauce and tomato paste. Low-sodium or reduced-sodium canned vegetables.  Grains  Whole-grain or whole-wheat bread. Whole-grain or whole-wheat pasta. Brown rice. Oatmeal. Quinoa. Bulgur. Whole-grain and low-sodium cereals. Malka bread. Low-fat, low-sodium crackers. Whole-wheat flour tortillas.  Meats and other proteins  Skinless chicken or turkey. Ground chicken or turkey. Pork with fat trimmed off. Fish and seafood. Egg whites. Dried beans, peas, or lentils. Unsalted nuts, nut butters, and seeds. Unsalted canned beans. Lean cuts of beef with fat trimmed off. Low-sodium, lean precooked or cured meat, such as sausages or meat loaves.  Dairy  Low-fat (1%) or fat-free (skim) milk. Reduced-fat, low-fat, or fat-free cheeses. Nonfat, low-sodium ricotta or cottage cheese. Low-fat or nonfat yogurt. Low-fat, low-sodium cheese.  Fats and oils  Soft margarine without trans fats. Vegetable oil. Reduced-fat, low-fat, or light mayonnaise and salad dressings (reduced-sodium). Canola, safflower, olive, avocado, soybean, and sunflower oils. Avocado.  Seasonings and condiments  Herbs. Spices. Seasoning mixes without salt.  Other  foods  Unsalted popcorn and pretzels. Fat-free sweets.  The items listed above may not be a complete list of foods and beverages you can eat. Contact a dietitian for more information.  What foods should I avoid?  Fruits  Canned fruit in a light or heavy syrup. Fried fruit. Fruit in cream or butter sauce.  Vegetables  Creamed or fried vegetables. Vegetables in a cheese sauce. Regular canned vegetables (not low-sodium or reduced-sodium). Regular canned tomato sauce and paste (not low-sodium or reduced-sodium). Regular tomato and vegetable juice (not low-sodium or reduced-sodium). Pickles. Olives.  Grains  Baked goods made with fat, such as croissants, muffins, or some breads. Dry pasta or rice meal packs.  Meats and other proteins  Fatty cuts of meat. Ribs. Fried meat. Dejesus. Bologna, salami, and other precooked or cured meats, such as sausages or meat loaves. Fat from the back of a pig (fatback). Bratwurst. Salted nuts and seeds. Canned beans with added salt. Canned or smoked fish. Whole eggs or egg yolks. Chicken or turkey with skin.  Dairy  Whole or 2% milk, cream, and half-and-half. Whole or full-fat cream cheese. Whole-fat or sweetened yogurt. Full-fat cheese. Nondairy creamers. Whipped toppings. Processed cheese and cheese spreads.  Fats and oils  Butter. Stick margarine. Lard. Shortening. Ghee. Dejeuss fat. Tropical oils, such as coconut, palm kernel, or palm oil.  Seasonings and condiments  Onion salt, garlic salt, seasoned salt, table salt, and sea salt. Worcestershire sauce. Tartar sauce. Barbecue sauce. Teriyaki sauce. Soy sauce, including reduced-sodium. Steak sauce. Canned and packaged gravies. Fish sauce. Oyster sauce. Cocktail sauce. Store-bought horseradish. Ketchup. Mustard. Meat flavorings and tenderizers. Bouillon cubes. Hot sauces. Pre-made or packaged marinades. Pre-made or packaged taco seasonings. Relishes. Regular salad dressings.  Other foods  Salted popcorn and pretzels.  The items listed above  may not be a complete list of foods and beverages you should avoid. Contact a dietitian for more information.  Where to find more information  National Heart, Lung, and Blood [place]: www.nhlbi.nih.gov  American Heart Association: www.heart.org  Academy of Nutrition and Dietetics: www.eatright.org  National Kidney Foundation: www.kidney.org  Summary  The DASH eating plan is a healthy eating plan that has been shown to reduce high blood pressure (hypertension). It may also reduce your risk for type 2 diabetes, heart disease, and stroke.  When on the DASH eating plan, aim to eat more fresh fruits and vegetables, whole grains, lean proteins, low-fat dairy, and heart-healthy fats.  With the DASH eating plan, you should limit salt (sodium) intake to 2,300 mg a day. If you have hypertension, you may need to reduce your sodium intake to 1,500 mg a day.  Work with your health care provider or dietitian to adjust your eating plan to your individual calorie needs.  This information is not intended to replace advice given to you by your health care provider. Make sure you discuss any questions you have with your health care provider.  Document Revised: 11/20/2020 Document Reviewed: 11/20/2020  [employer] Patient Education © 2021 [employer].       Heart-Healthy Eating Plan  Heart-healthy meal planning includes:  Eating less unhealthy fats.  Eating more healthy fats.  Making other changes in your diet.  Talk with your doctor or a diet specialist (dietitian) to create an eating plan that is right for you.  What is my plan?  Your doctor may recommend an eating plan that includes:  Total fat: ______% or less of total calories a day.  Saturated fat: ______% or less of total calories a day.  Cholesterol: less than _________mg a day.  What are tips for following this plan?  Cooking  Avoid frying your food. Try to bake, boil, grill, or broil it instead. You can also reduce fat by:  Removing the skin from poultry.  Removing all  visible fats from meats.  Steaming vegetables in water or broth.  Meal planning       At meals, divide your plate into four equal parts:  Fill one-half of your plate with vegetables and green salads.  Fill one-fourth of your plate with whole grains.  Fill one-fourth of your plate with lean protein foods.  Eat 4-5 servings of vegetables per day. A serving of vegetables is:  1 cup of raw or cooked vegetables.  2 cups of raw leafy greens.  Eat 4-5 servings of fruit per day. A serving of fruit is:  1 medium whole fruit.  ¼ cup of dried fruit.  ½ cup of fresh, frozen, or canned fruit.  ½ cup of 100% fruit juice.  Eat more foods that have soluble fiber. These are apples, broccoli, carrots, beans, peas, and barley. Try to get 20-30 g of fiber per day.  Eat 4-5 servings of nuts, legumes, and seeds per week:  1 serving of dried beans or legumes equals ½ cup after being cooked.  1 serving of nuts is ¼ cup.  1 serving of seeds equals 1 tablespoon.     General information  Eat more home-cooked food. Eat less restaurant, buffet, and fast food.  Limit or avoid alcohol.  Limit foods that are high in starch and sugar.  Avoid fried foods.  Lose weight if you are overweight.  Keep track of how much salt (sodium) you eat. This is important if you have high blood pressure. Ask your doctor to tell you more about this.  Try to add vegetarian meals each week.  Fats  Choose healthy fats. These include olive oil and canola oil, flaxseeds, walnuts, almonds, and seeds.  Eat more omega-3 fats. These include salmon, mackerel, sardines, tuna, flaxseed oil, and ground flaxseeds. Try to eat fish at least 2 times each week.  Check food labels. Avoid foods with trans fats or high amounts of saturated fat.  Limit saturated fats.  These are often found in animal products, such as meats, butter, and cream.  These are also found in plant foods, such as palm oil, palm kernel oil, and coconut oil.  Avoid foods with partially hydrogenated oils in them.  These have trans fats. Examples are stick margarine, some tub margarines, cookies, crackers, and other baked goods.  What foods can I eat?  Fruits  All fresh, canned (in natural juice), or frozen fruits.  Vegetables  Fresh or frozen vegetables (raw, steamed, roasted, or grilled). Green salads.  Grains  Most grains. Choose whole wheat and whole grains most of the time. Rice and pasta, including brown rice and pastas made with whole wheat.  Meats and other proteins  Lean, well-trimmed beef, veal, pork, and lamb. Chicken and turkey without skin. All fish and shellfish. Wild duck, rabbit, pheasant, and venison. Egg whites or low-cholesterol egg substitutes. Dried beans, peas, lentils, and tofu. Seeds and most nuts.  Dairy  Low-fat or nonfat cheeses, including ricotta and mozzarella. Skim or 1% milk that is liquid, powdered, or evaporated. Buttermilk that is made with low-fat milk. Nonfat or low-fat yogurt.  Fats and oils  Non-hydrogenated (trans-free) margarines. Vegetable oils, including soybean, sesame, sunflower, olive, peanut, safflower, corn, canola, and cottonseed. Salad dressings or mayonnaise made with a vegetable oil.  Beverages  Mineral water. Coffee and tea. Diet carbonated beverages.  Sweets and desserts  Sherbet, gelatin, and fruit ice. Small amounts of dark chocolate.  Limit all sweets and desserts.  Seasonings and condiments  All seasonings and condiments.  The items listed above may not be a complete list of foods and drinks you can eat. Contact a dietitian for more options.  What foods should I avoid?  Fruits  Canned fruit in heavy syrup. Fruit in cream or butter sauce. Fried fruit. Limit coconut.  Vegetables  Vegetables cooked in cheese, cream, or butter sauce. Fried vegetables.  Grains  Breads that are made with saturated or trans fats, oils, or whole milk. Croissants. Sweet rolls. Donuts. High-fat crackers, such as cheese crackers.  Meats and other proteins  Fatty meats, such as hot dogs, ribs,  sausage, camacho, rib-eye roast or steak. High-fat deli meats, such as salami and bologna. Caviar. Domestic duck and goose. Organ meats, such as liver.  Dairy  Cream, sour cream, cream cheese, and creamed cottage cheese. Whole-milk cheeses. Whole or 2% milk that is liquid, evaporated, or condensed. Whole buttermilk. Cream sauce or high-fat cheese sauce. Yogurt that is made from whole milk.  Fats and oils  Meat fat, or shortening. Cocoa butter, hydrogenated oils, palm oil, coconut oil, palm kernel oil. Solid fats and shortenings, including camacho fat, salt pork, lard, and butter. Nondairy cream substitutes. Salad dressings with cheese or sour cream.  Beverages  Regular sodas and juice drinks with added sugar.  Sweets and desserts  Frosting. Pudding. Cookies. Cakes. Pies. Milk chocolate or white chocolate. Buttered syrups. Full-fat ice cream or ice cream drinks.  The items listed above may not be a complete list of foods and drinks to avoid. Contact a dietitian for more information.  Summary  Heart-healthy meal planning includes eating less unhealthy fats, eating more healthy fats, and making other changes in your diet.  Eat a balanced diet. This includes fruits and vegetables, low-fat or nonfat dairy, lean protein, nuts and legumes, whole grains, and heart-healthy oils and fats.  This information is not intended to replace advice given to you by your health care provider. Make sure you discuss any questions you have with your health care provider.  Document Revised: 02/21/2019 Document Reviewed: 01/25/2019  [employer]vier Patient Education © 2021 [employer].       Mediterranean Diet  A Mediterranean diet refers to food and lifestyle choices that are based on the traditions of countries located on the [place]. This way of eating has been shown to help prevent certain conditions and improve outcomes for people who have chronic diseases, like kidney disease and heart disease.  What are tips for following this  plan?  Lifestyle  Cook and eat meals together with your family, when possible.  Drink enough fluid to keep your urine clear or pale yellow.  Be physically active every day. This includes:  Aerobic exercise like running or swimming.  Leisure activities like gardening, walking, or housework.  Get 7-8 hours of sleep each night.  If recommended by your health care provider, drink red wine in moderation. This means 1 glass a day for nonpregnant women and 2 glasses a day for men. A glass of wine equals 5 oz (150 mL).  Reading food labels       Check the serving size of packaged foods. For foods such as rice and pasta, the serving size refers to the amount of cooked product, not dry.  Check the total fat in packaged foods. Avoid foods that have saturated fat or trans fats.  Check the ingredients list for added sugars, such as corn syrup.     Shopping  At the grocery store, buy most of your food from the areas near the walls of the store. This includes:  Fresh fruits and vegetables (produce).  Grains, beans, nuts, and seeds. Some of these may be available in unpackaged forms or large amounts (in bulk).  Fresh seafood.  Poultry and eggs.  Low-fat dairy products.  Buy whole ingredients instead of prepackaged foods.  Buy fresh fruits and vegetables in-season from local farmers markets.  Buy frozen fruits and vegetables in resealable bags.  If you do not have access to quality fresh seafood, buy precooked frozen shrimp or canned fish, such as tuna, salmon, or sardines.  Buy small amounts of raw or cooked vegetables, salads, or olives from the deli or salad bar at your store.  Stock your pantry so you always have certain foods on hand, such as olive oil, canned tuna, canned tomatoes, rice, pasta, and beans.  Cooking  Cook foods with extra-virgin olive oil instead of using butter or other vegetable oils.  Have meat as a side dish, and have vegetables or grains as your main dish. This means having meat in small portions or adding  small amounts of meat to foods like pasta or stew.  Use beans or vegetables instead of meat in common dishes like chili or lasagna.  Vanoss with different cooking methods. Try roasting or broiling vegetables instead of steaming or sautéeing them.  Add frozen vegetables to soups, stews, pasta, or rice.  Add nuts or seeds for added healthy fat at each meal. You can add these to yogurt, salads, or vegetable dishes.  Marinate fish or vegetables using olive oil, lemon juice, garlic, and fresh herbs.  Meal planning       Plan to eat 1 vegetarian meal one day each week. Try to work up to 2 vegetarian meals, if possible.  Eat seafood 2 or more times a week.  Have healthy snacks readily available, such as:  Vegetable sticks with hummus.  Greek yogurt.  Fruit and nut trail mix.  Eat balanced meals throughout the week. This includes:  Fruit: 2-3 servings a day  Vegetables: 4-5 servings a day  Low-fat dairy: 2 servings a day  Fish, poultry, or lean meat: 1 serving a day  Beans and legumes: 2 or more servings a week  Nuts and seeds: 1-2 servings a day  Whole grains: 6-8 servings a day  Extra-virgin olive oil: 3-4 servings a day  Limit red meat and sweets to only a few servings a month     What are my food choices?  Mediterranean diet  Recommended  Grains: Whole-grain pasta. Brown rice. Bulgar wheat. Polenta. Couscous. Whole-wheat bread. Oatmeal. Quinoa.  Vegetables: Artichokes. Beets. Broccoli. Cabbage. Carrots. Eggplant. Green beans. Chard. Kale. Spinach. Onions. Leeks. Peas. Squash. Tomatoes. Peppers. Radishes.  Fruits: Apples. Apricots. Avocado. Berries. Bananas. Cherries. Dates. Figs. Grapes. Vasile. Melon. Oranges. Peaches. Plums. Pomegranate.  Meats and other protein foods: Beans. Almonds. Sunflower seeds. Pine nuts. Peanuts. Cod. [place]. Scallops. Shrimp. Tuna. Tilapia. Clams. Oysters. Eggs.  Dairy: Low-fat milk. Cheese. Greek yogurt.  Beverages: Water. Red wine. Herbal tea.  Fats and oils: Extra virgin olive oil.  Avocado oil. Grape seed oil.  Sweets and desserts: Greek yogurt with honey. Baked apples. Poached pears. Trail mix.  Seasoning and other foods: Basil. Cilantro. Coriander. Cumin. Mint. Parsley. Branden. Rosemary. Tarragon. Garlic. Oregano. Thyme. Pepper. Balsalmic vinegar. Tahini. Hummus. Tomato sauce. Olives. Mushrooms.  Limit these  Grains: Prepackaged pasta or rice dishes. Prepackaged cereal with added sugar.  Vegetables: Deep fried potatoes (french fries).  Fruits: Fruit canned in syrup.  Meats and other protein foods: Beef. Pork. Lamb. Poultry with skin. Hot dogs. Dejesus.  Dairy: Ice cream. Sour cream. Whole milk.  Beverages: Juice. Sugar-sweetened soft drinks. Beer. Liquor and spirits.  Fats and oils: Butter. Canola oil. Vegetable oil. Beef fat (tallow). Lard.  Sweets and desserts: Cookies. Cakes. Pies. Candy.  Seasoning and other foods: Mayonnaise. Premade sauces and marinades.  The items listed may not be a complete list. Talk with your dietitian about what dietary choices are right for you.  Summary  The Mediterranean diet includes both food and lifestyle choices.  Eat a variety of fresh fruits and vegetables, beans, nuts, seeds, and whole grains.  Limit the amount of red meat and sweets that you eat.  Talk with your health care provider about whether it is safe for you to drink red wine in moderation. This means 1 glass a day for nonpregnant women and 2 glasses a day for men. A glass of wine equals 5 oz (150 mL).  This information is not intended to replace advice given to you by your health care provider. Make sure you discuss any questions you have with your health care provider.  Document Revised: 08/17/2017 Document Reviewed: 08/10/2017  Elsevier Patient Education © 2020 [employer] Inc.         Exercising to Stay Healthy  To become healthy and stay healthy, it is recommended that you do moderate-intensity and vigorous-intensity exercise. You can tell that you are exercising at a moderate intensity if your  heart starts beating faster and you start breathing faster but can still hold a conversation. You can tell that you are exercising at a vigorous intensity if you are breathing much harder and faster and cannot hold a conversation while exercising.  Exercising regularly is important. It has many health benefits, such as:  Improving overall fitness, flexibility, and endurance.  Increasing bone density.  Helping with weight control.  Decreasing body fat.  Increasing muscle strength.  Reducing stress and tension.  Improving overall health.  How often should I exercise?  Choose an activity that you enjoy, and set realistic goals. Your health care provider can help you make an activity plan that works for you.  Exercise regularly as told by your health care provider. This may include:  Doing strength training two times a week, such as:  Lifting weights.  Using resistance bands.  Push-ups.  Sit-ups.  Yoga.  Doing a certain intensity of exercise for a given amount of time. Choose from these options:  A total of 150 minutes of moderate-intensity exercise every week.  A total of 75 minutes of vigorous-intensity exercise every week.  A mix of moderate-intensity and vigorous-intensity exercise every week.  Children, pregnant women, people who have not exercised regularly, people who are overweight, and older adults may need to talk with a health care provider about what activities are safe to do. If you have a medical condition, be sure to talk with your health care provider before you start a new exercise program.  What are some exercise ideas?    Moderate-intensity exercise ideas include:  Walking 1 mile (1.6 km) in about 15 minutes.  Biking.  Hiking.  Golfing.  Dancing.  Water aerobics.  Vigorous-intensity exercise ideas include:  Walking 4.5 miles (7.2 km) or more in about 1 hour.  Jogging or running 5 miles (8 km) in about 1 hour.  Biking 10 miles (16.1 km) or more in about 1 hour.  Lap swimming.  Roller-skating or in-line  skating.  Cross-country skiing.  Vigorous competitive sports, such as football, basketball, and soccer.  Jumping rope.  Aerobic dancing.  What are some everyday activities that can help me to get exercise?  Yard work, such as:  Pushing a .  Raking and bagging leaves.  Washing your car.  Pushing a stroller.  Shoveling snow.  Gardening.  Washing windows or floors.  How can I be more active in my day-to-day activities?  Use stairs instead of an elevator.  Take a walk during your lunch break.  If you drive, park your car farther away from your work or school.  If you take public transportation, get off one stop early and walk the rest of the way.  Stand up or walk around during all of your indoor phone calls.  Get up, stretch, and walk around every 30 minutes throughout the day.  Enjoy exercise with a friend. Support to continue exercising will help you keep a regular routine of activity.  What guidelines can I follow while exercising?  Before you start a new exercise program, talk with your health care provider.  Do not exercise so much that you hurt yourself, feel dizzy, or get very short of breath.  Wear comfortable clothes and wear shoes with good support.  Drink plenty of water while you exercise to prevent dehydration or heat stroke.  Work out until your breathing and your heartbeat get faster.  Where to find more information  [place] Department of Health and Human Services: www.hhs.gov  Centers for Disease Control and Prevention (CDC): www.cdc.gov  Summary  Exercising regularly is important. It will improve your overall fitness, flexibility, and endurance.  Regular exercise also will improve your overall health. It can help you control your weight, reduce stress, and improve your bone density.  Do not exercise so much that you hurt yourself, feel dizzy, or get very short of breath.  Before you start a new exercise program, talk with your health care provider.  This information is not intended to replace  advice given to you by your health care provider. Make sure you discuss any questions you have with your health care provider.  Document Revised: 11/30/2018 Document Reviewed: 11/08/2018  Elsevier Patient Education © 2021 [employer]           Mindfulness-Based Stress Reduction  Mindfulness-based stress reduction (MBSR) is a program that helps people learn to practice mindfulness. Mindfulness is the practice of intentionally paying attention to the present moment. It can be learned and practiced through techniques such as education, breathing exercises, meditation, and yoga. MBSR includes several mindfulness techniques in one program.  MBSR works best when you understand the treatment, are willing to try new things, and can commit to spending time practicing what you learn. MBSR training may include learning about:  How your emotions, thoughts, and reactions affect your body.  New ways to respond to things that cause negative thoughts to start (triggers).  How to notice your thoughts and let go of them.  Practicing awareness of everyday things that you normally do without thinking.  The techniques and goals of different types of meditation.  What are the benefits of MBSR?  MBSR can have many benefits, which include helping you to:  Develop self-awareness. This refers to knowing and understanding yourself.  Learn skills and attitudes that help you to participate in your own health care.  Learn new ways to care for yourself.  Be more accepting about how things are, and let things go.  Be less judgmental and approach things with an open mind.  Be patient with yourself and trust yourself more.  MBSR has also been shown to:  Reduce negative emotions, such as depression and anxiety.  Improve memory and focus.  Change how you sense and approach pain.  Boost your body's ability to fight infections.  Help you connect better with other people.  Improve your sense of well-being.  Follow these instructions at home:       Find  a local in-person or online MBSR program.  Set aside some time regularly for mindfulness practice.  Find a mindfulness practice that works best for you. This may include one or more of the following:  Meditation. Meditation involves focusing your mind on a certain thought or activity.  Breathing awareness exercises. These help you to stay present by focusing on your breath.  Body scan. For this practice, you lie down and pay attention to each part of your body from head to toe. You can identify tension and soreness and intentionally relax parts of your body.  Yoga. Yoga involves stretching and breathing, and it can improve your ability to move and be flexible. It can also provide an experience of testing your body's limits, which can help you release stress.  Mindful eating. This way of eating involves focusing on the taste, texture, color, and smell of each bite of food. Because this slows down eating and helps you feel full sooner, it can be an important part of a weight-loss plan.  Find a podcast or recording that provides guidance for breathing awareness, body scan, or meditation exercises. You can listen to these any time when you have a free moment to rest without distractions.  Follow your treatment plan as told by your health care provider. This may include taking regular medicines and making changes to your diet or lifestyle as recommended.  How to practice mindfulness  To do a basic awareness exercise:  Find a comfortable place to sit.  Pay attention to the present moment. Observe your thoughts, feelings, and surroundings just as they are.  Avoid placing judgment on yourself, your feelings, or your surroundings. Make note of any judgment that comes up, and let it go.  Your mind may wander, and that is okay. Make note of when your thoughts drift, and return your attention to the present moment.  To do basic mindfulness meditation:  Find a comfortable place to sit. This may include a stable chair or a firm  floor cushion.  Sit upright with your back straight. Let your arms fall next to your side with your hands resting on your legs.  If sitting in a chair, rest your feet flat on the floor.  If sitting on a cushion, cross your legs in front of you.  Keep your head in a neutral position with your chin dropped slightly. Relax your jaw and rest the tip of your tongue on the roof of your mouth. Drop your gaze to the floor. You can close your eyes if you like.  Breathe normally and pay attention to your breath. Feel the air moving in and out of your nose. Feel your belly expanding and relaxing with each breath.  Your mind may wander, and that is okay. Make note of when your thoughts drift, and return your attention to your breath.  Avoid placing judgment on yourself, your feelings, or your surroundings. Make note of any judgment or feelings that come up, let them go, and bring your attention back to your breath.  When you are ready, lift your gaze or open your eyes. Pay attention to how your body feels after the meditation.  Where to find more information  You can find more information about MBSR from:  Your health care provider.  Community-based meditation centers or programs.  Programs offered near you.  Summary  Mindfulness-based stress reduction (MBSR) is a program that teaches you how to intentionally pay attention to the present moment. It is used with other treatments to help you cope better with daily stress, emotions, and pain.  MBSR focuses on developing self-awareness, which allows you to respond to life stress without judgment or negative emotions.  MBSR programs may involve learning different mindfulness practices, such as breathing exercises, meditation, yoga, body scan, or mindful eating. Find a mindfulness practice that works best for you, and set aside time for it on a regular basis.  This information is not intended to replace advice given to you by your health care provider. Make sure you discuss any  questions you have with your health care provider.  Document Revised: 02/22/2021 Document Reviewed: 04/26/2018  Elsevier Patient Education © 2021 Elsevier Inc.

## 2023-03-04 LAB
ALBUMIN SERPL-MCNC: 4.5 G/DL (ref 3.5–5.2)
ALBUMIN/GLOB SERPL: 1.4 G/DL
ALP SERPL-CCNC: 84 U/L (ref 39–117)
ALT SERPL W P-5'-P-CCNC: 53 U/L (ref 1–41)
ANION GAP SERPL CALCULATED.3IONS-SCNC: 11.4 MMOL/L (ref 5–15)
AST SERPL-CCNC: 31 U/L (ref 1–40)
BILIRUB SERPL-MCNC: 0.4 MG/DL (ref 0–1.2)
BUN SERPL-MCNC: 17 MG/DL (ref 6–20)
BUN/CREAT SERPL: 17 (ref 7–25)
CALCIUM SPEC-SCNC: 9.6 MG/DL (ref 8.6–10.5)
CHLORIDE SERPL-SCNC: 99 MMOL/L (ref 98–107)
CO2 SERPL-SCNC: 27.6 MMOL/L (ref 22–29)
CREAT SERPL-MCNC: 1 MG/DL (ref 0.76–1.27)
EGFRCR SERPLBLD CKD-EPI 2021: 92.8 ML/MIN/1.73
GLOBULIN UR ELPH-MCNC: 3.2 GM/DL
GLUCOSE SERPL-MCNC: 123 MG/DL (ref 65–99)
POTASSIUM SERPL-SCNC: 3.5 MMOL/L (ref 3.5–5.2)
PROT SERPL-MCNC: 7.7 G/DL (ref 6–8.5)
SODIUM SERPL-SCNC: 138 MMOL/L (ref 136–145)

## 2023-03-13 ENCOUNTER — TELEPHONE (OUTPATIENT)
Dept: INTERNAL MEDICINE | Facility: CLINIC | Age: 48
End: 2023-03-13
Payer: COMMERCIAL

## 2023-03-13 NOTE — TELEPHONE ENCOUNTER
Called patient to ensure viewing of [employer] lab results. Patient had not yet viewed, left message for patient to return call to clinic to review results.    HUB TO READ:    Labs overall are reassuring.     CMP notable for mildly elevated blood sugar.  ALT, a liver enzyme, is also mildly elevated.     Lipids notable for elevated total and LDL cholesterol.  Triglycerides are also elevated.  These numbers aren't extreme enough to require a medicine.  I would recommend a heart healthy diet.     A1c is stable.     TSH is within normal limits.     CBC is within normal limits.

## 2023-03-14 NOTE — TELEPHONE ENCOUNTER
2nd attempt to contact patient to ensure viewing of [employer] lab results. Patient had not yet viewed, left message for patient to return call to clinic to review results.     HUB TO READ:     Labs overall are reassuring.     CMP notable for mildly elevated blood sugar.  ALT, a liver enzyme, is also mildly elevated.     Lipids notable for elevated total and LDL cholesterol.  Triglycerides are also elevated.  These numbers aren't extreme enough to require a medicine.  I would recommend a heart healthy diet.     A1c is stable.     TSH is within normal limits.     CBC is within normal limits.

## 2023-03-20 NOTE — TELEPHONE ENCOUNTER
Third attempt to contact patient to ensure viewing of [email or web address] lab results. Patient had not yet viewed, left message for patient to return call to clinic to review results.  HUB TO READ:     Labs overall are reassuring.     CMP notable for mildly elevated blood sugar.  ALT, a liver enzyme, is also mildly elevated.     Lipids notable for elevated total and LDL cholesterol.  Triglycerides are also elevated.  These numbers aren't extreme enough to require a medicine.  I would recommend a heart healthy diet.     A1c is stable.     TSH is within normal limits.     CBC is within normal limits.

## 2023-03-30 ENCOUNTER — TELEPHONE (OUTPATIENT)
Dept: SURGERY | Facility: CLINIC | Age: 48
End: 2023-03-30
Payer: COMMERCIAL

## 2023-03-30 NOTE — TELEPHONE ENCOUNTER
Caller: Isidoro Estrella    Relationship to patient: SELF    Best call back number:[phone]   Patient is needing: PATIENT WOULD LIKE TO RESCHEDULE COLONOSCOPY

## 2023-04-13 NOTE — TELEPHONE ENCOUNTER
Spoke with patient and was able to r/s patient colonoscopy for 7/27 with a 9:30 arrival time. Patient still had bowel prep instructions and had no further questions. Spoke with quita in scheduling.

## 2023-06-02 ENCOUNTER — OFFICE VISIT (OUTPATIENT)
Dept: INTERNAL MEDICINE | Facility: CLINIC | Age: 48
End: 2023-06-02

## 2023-06-02 VITALS
OXYGEN SATURATION: 98 % | HEIGHT: 65 IN | DIASTOLIC BLOOD PRESSURE: 90 MMHG | HEART RATE: 84 BPM | SYSTOLIC BLOOD PRESSURE: 133 MMHG | BODY MASS INDEX: 33.09 KG/M2 | WEIGHT: 198.6 LBS | TEMPERATURE: 99.1 F

## 2023-06-02 DIAGNOSIS — R73.03 PREDIABETES: ICD-10-CM

## 2023-06-02 DIAGNOSIS — I10 ESSENTIAL HYPERTENSION: Primary | ICD-10-CM

## 2023-06-02 LAB
ALBUMIN SERPL-MCNC: 4.3 G/DL (ref 3.5–5.2)
ALBUMIN/GLOB SERPL: 1.4 G/DL
ALP SERPL-CCNC: 78 U/L (ref 39–117)
ALT SERPL W P-5'-P-CCNC: 48 U/L (ref 1–41)
ANION GAP SERPL CALCULATED.3IONS-SCNC: 10.1 MMOL/L (ref 5–15)
AST SERPL-CCNC: 36 U/L (ref 1–40)
BILIRUB SERPL-MCNC: 0.6 MG/DL (ref 0–1.2)
BUN SERPL-MCNC: 23 MG/DL (ref 6–20)
BUN/CREAT SERPL: 21.3 (ref 7–25)
CALCIUM SPEC-SCNC: 9.3 MG/DL (ref 8.6–10.5)
CHLORIDE SERPL-SCNC: 103 MMOL/L (ref 98–107)
CO2 SERPL-SCNC: 23.9 MMOL/L (ref 22–29)
CREAT SERPL-MCNC: 1.08 MG/DL (ref 0.76–1.27)
EGFRCR SERPLBLD CKD-EPI 2021: 84.6 ML/MIN/1.73
GLOBULIN UR ELPH-MCNC: 3 GM/DL
GLUCOSE SERPL-MCNC: 111 MG/DL (ref 65–99)
HBA1C MFR BLD: 6 % (ref 4.8–5.6)
POTASSIUM SERPL-SCNC: 3.7 MMOL/L (ref 3.5–5.2)
PROT SERPL-MCNC: 7.3 G/DL (ref 6–8.5)
SODIUM SERPL-SCNC: 137 MMOL/L (ref 136–145)

## 2023-06-02 PROCEDURE — 83036 HEMOGLOBIN GLYCOSYLATED A1C: CPT | Performed by: STUDENT IN AN ORGANIZED HEALTH CARE EDUCATION/TRAINING PROGRAM

## 2023-06-02 PROCEDURE — 80053 COMPREHEN METABOLIC PANEL: CPT | Performed by: STUDENT IN AN ORGANIZED HEALTH CARE EDUCATION/TRAINING PROGRAM

## 2023-06-02 RX ORDER — AMLODIPINE BESYLATE AND BENAZEPRIL HYDROCHLORIDE 10; 20 MG/1; MG/1
1 CAPSULE ORAL DAILY
Qty: 90 CAPSULE | Refills: 2 | Status: SHIPPED | OUTPATIENT
Start: 2023-06-02

## 2023-06-02 NOTE — PROGRESS NOTES
"Chief Complaint  Hypertension and Diabetes    Subjective          Isidoro Estrella presents to [facility]  History of Present Illness    Has been limiting sodas as well as eating out less, and has lost weight in the interim.      Trying to watch the sodium in his diet.    Current Outpatient Medications   Medication Instructions   • amLODIPine-benazepril (Lotrel) 10-20 MG per capsule 1 capsule, Oral, Daily   • cetirizine (ZYRTEC) 10 mg, Oral, Daily   • Cholecalciferol 2,000 Units, Oral, Daily   • Elastic Bandages & Supports (Wrist Brace Deluxe) misc 1 Units, Does not apply, Nightly   • polyethylene glycol (MIRALAX) 17 GM/SCOOP powder See Admin Instructions   • sodium-potassium-magnesium sulfates (Suprep Bowel Prep Kit) 17.5-3.13-1.6 GM/177ML solution oral solution Take as directed.  Instructions given in office.  Dispense: 2 bottles     The following portions of the patient's history were reviewed and updated as appropriate: allergies, current medications, past family history, past medical history, past social history, past surgical history, and problem list.    Objective   Vital Signs:   /90 (BP Location: Right arm, Patient Position: Sitting, Cuff Size: Adult)   Pulse 84   Temp 99.1 °F (37.3 °C) (Temporal)   Ht 165.1 cm (65\")   Wt 90.1 kg (198 lb 9.6 oz)   SpO2 98%   BMI 33.05 kg/m²     Wt Readings from Last 3 Encounters:   06/02/23 90.1 kg (198 lb 9.6 oz)   03/03/23 92.7 kg (204 lb 6.4 oz)   09/28/22 92.6 kg (204 lb 3.2 oz)     BP Readings from Last 3 Encounters:   06/02/23 133/90   03/03/23 164/98   09/28/22 124/84     Physical Exam  Vitals reviewed.   Constitutional:       General: He is not in acute distress.     Appearance: Normal appearance. He is not ill-appearing, toxic-appearing or diaphoretic.   HENT:      Head: Normocephalic and atraumatic.      Right Ear: External ear normal.      Left Ear: External ear normal.   Eyes:      Conjunctiva/sclera: " Conjunctivae normal.   Cardiovascular:      Rate and Rhythm: Normal rate and regular rhythm.      Pulses: Normal pulses.      Heart sounds: Normal heart sounds. No murmur heard.    No friction rub. No gallop.   Pulmonary:      Effort: Pulmonary effort is normal. No respiratory distress.      Breath sounds: Normal breath sounds. No stridor. No wheezing, rhonchi or rales.   Chest:      Chest wall: No tenderness.   Abdominal:      General: Abdomen is flat.      Palpations: Abdomen is soft. There is no mass.      Tenderness: There is no abdominal tenderness.   Musculoskeletal:      Right lower leg: No edema.      Left lower leg: No edema.   Skin:     General: Skin is warm and dry.   Neurological:      General: No focal deficit present.      Mental Status: He is alert. Mental status is at baseline.   Psychiatric:         Behavior: Behavior normal.         Thought Content: Thought content normal.         Judgment: Judgment normal.            Result Review :   The following data was reviewed by: Cirilo Oneal MD on 06/02/2023:  Common labs        6/27/2022    09:54 9/28/2022    10:05 3/3/2023    16:28   Common Labs   Glucose 104   101   123     BUN 18   13   17     Creatinine 1.13   0.99   1.00     Sodium 139   137   138     Potassium 4.0   3.8   3.5     Chloride 101   100   99     Calcium 10.4   9.4   9.6     Albumin 4.40   4.30   4.5     Total Bilirubin 0.5   0.4   0.4     Alkaline Phosphatase 73   85   84     AST (SGOT) 23   31   31     ALT (SGPT) 30   49   53     WBC 5.46    6.16     Hemoglobin 15.7    15.6     Hematocrit 48.4    45.8     Platelets 227    237     Total Cholesterol 212    214     Triglycerides 192    282     HDL Cholesterol 41    43     LDL Cholesterol  136    121     Hemoglobin A1C 6.00   5.70   5.80              Lab Results   Component Value Date    BILIRUBINUR Negative 06/27/2022       Procedures        Assessment and Plan    Diagnoses and all orders for this visit:    1. Essential hypertension  (Primary)  -     Comprehensive Metabolic Panel  -     amLODIPine-benazepril (Lotrel) 10-20 MG per capsule; Take 1 capsule by mouth Daily.  Dispense: 90 capsule; Refill: 2    2. Prediabetes  -     Comprehensive Metabolic Panel  -     Hemoglobin A1c      HTN:  -BP is above goal of less than 130/80  -will switch from amlodipine to lotrel  -low sodium dietary counseling    Medications Discontinued During This Encounter   Medication Reason   • amLODIPine (NORVASC) 10 MG tablet         Follow Up   Return in about 6 months (around 12/2/2023) for Annual physical.  Patient was given instructions and counseling regarding his condition or for health maintenance advice. Please see specific information pulled into the AVS if appropriate.       Cirilo Oneal MD  06/02/23  16:29 EDT

## 2023-07-21 ENCOUNTER — HOSPITAL ENCOUNTER (OUTPATIENT)
Facility: HOSPITAL | Age: 48
Setting detail: HOSPITAL OUTPATIENT SURGERY
Discharge: HOME | End: 2023-07-21
Attending: SURGERY | Admitting: SURGERY
Payer: COMMERCIAL

## 2023-07-21 VITALS
HEIGHT: 65 IN | DIASTOLIC BLOOD PRESSURE: 83 MMHG | BODY MASS INDEX: 32.18 KG/M2 | TEMPERATURE: 97.7 F | RESPIRATION RATE: 18 BRPM | OXYGEN SATURATION: 98 % | HEART RATE: 90 BPM | SYSTOLIC BLOOD PRESSURE: 120 MMHG | WEIGHT: 193.12 LBS

## 2023-07-21 RX ORDER — SODIUM CHLORIDE, SODIUM LACTATE, POTASSIUM CHLORIDE, CALCIUM CHLORIDE 600; 310; 30; 20 MG/100ML; MG/100ML; MG/100ML; MG/100ML
30 INJECTION, SOLUTION INTRAVENOUS CONTINUOUS
Status: DISCONTINUED | OUTPATIENT
Start: 2023-07-21 | End: 2023-07-21 | Stop reason: HOSPADM

## 2023-07-21 RX ADMIN — SODIUM CHLORIDE, POTASSIUM CHLORIDE, SODIUM LACTATE AND CALCIUM CHLORIDE 30 ML/HR: 600; 310; 30; 20 INJECTION, SOLUTION INTRAVENOUS at 13:35

## 2023-07-21 NOTE — H&P
General Surgery/Colorectal Surgery Note    Patient Name:  Isidoro Estrella  YOB: 1975  [id number]    Referring Provider: Sharif Lopez, *      Patient Care Team:  Cirilo Oneal MD as PCP - General (Internal Medicine)  Ariana Evans APRN as Nurse Practitioner (Nurse Practitioner)    Subjective .     History of present illness:    HPI    colonoscopy on 9/6/2022 performed by Dr. Sharif Lopez.     Patient was with a 10 mm tubular adenoma of the cecum; also with a 15 mm traditional serrated adenoma of the sigmoid colon per colonoscopy/pathology.  Resection and retrieval of both polyps was complete.     Pathology:  Clinical Information     Screening for malignant neoplasm of colon   Final Diagnosis   1. Cecum polyp, biopsy:                - Fragments of tubular adenoma        2. Sigmoid colon polyp, biopsy:                - Traditional serrated adenoma            Electronically signed by Amparo Ro MD       Colonoscopy was performed without difficulty.  The patient tolerated the procedure well.     Given how large the polyps were, think it is reasonable to repeat the colonoscopy in 3 months.     Patient currently denies any abdominal pain, change in bowel habit, or rectal bleeding.     Denies any family history of colorectal cancer.       History:  Past Medical History:   Diagnosis Date    Allergic     Gallstones     Hypertension     Kidney stone        Past Surgical History:   Procedure Laterality Date    APPENDECTOMY      COLONOSCOPY N/A 9/6/2022    Procedure: COLONOSCOPY WITH POLYPECTOMIES, HOT SNARE;  Surgeon: Sharif Lopez MD;  Location: [facility] ENDOSCOPY;  Service: General;  Laterality: N/A;  COLON POLYPS    KIDNEY STONE SURGERY         Family History   Problem Relation Age of Onset    Diabetes Father        Social History     Tobacco Use    Smoking status: Never    Smokeless tobacco: Never   Vaping Use    Vaping Use: Never used   Substance Use Topics    Alcohol use:  Never    Drug use: Never       Review of Systems: See HPI    Review of Systems            Medications Prior to Admission   Medication Sig Dispense Refill Last Dose    amLODIPine-benazepril (Lotrel) 10-20 MG per capsule Take 1 capsule by mouth Daily. 90 capsule 2 7/19/2023    cetirizine (zyrTEC) 10 MG tablet Take 1 tablet by mouth Daily. 90 tablet 2 7/19/2023    Cholecalciferol 50 MCG (2000 UT) capsule Take 1 capsule by mouth Daily. 90 each 3 7/19/2023    Elastic Bandages & Supports (Wrist Brace Deluxe) misc 1 Units Every Night. (Patient not taking: Reported on 6/2/2023) 2 each 0          Home Meds:      Prior to Admission medications    Medication Sig Start Date End Date Taking? Authorizing Provider   amLODIPine-benazepril (Lotrel) 10-20 MG per capsule Take 1 capsule by mouth Daily. 6/2/23   Cirilo Oneal MD   cetirizine (zyrTEC) 10 MG tablet Take 1 tablet by mouth Daily. 9/28/22   Cirilo Oneal MD   Cholecalciferol 50 MCG (2000 UT) capsule Take 1 capsule by mouth Daily. 9/29/22   Cirilo Oneal MD   Elastic Bandages & Supports (Wrist Brace Deluxe) misc 1 Units Every Night.  Patient not taking: Reported on 6/2/2023 6/27/22   Cirilo Oneal MD   polyethylene glycol (MIRALAX) 17 GM/SCOOP powder See Admin Instructions.  Patient not taking: Reported on 6/2/2023 7/14/22 7/21/23  Provider, MD Jorge   sodium-potassium-magnesium sulfates (Suprep Bowel Prep Kit) 17.5-3.13-1.6 GM/177ML solution oral solution Take as directed.  Instructions given in office.  Dispense: 2 bottles  Patient not taking: Reported on 6/2/2023 9/13/22 7/21/23  Nathan, April, APRN            Allergies:  Latex and Penicillins      Objective     Vital Signs   Temp:  [98.6 °F (37 °C)] 98.6 °F (37 °C)  Heart Rate:  [80] 80  Resp:  [18] 18  BP: (130)/(85) 130/85    Physical Exam:     Physical Exam    NAD, A/O x 4, normal circulation, normal respiration      Result Review :     Assessment & Plan     There are no diagnoses linked to this  encounter.       Risks including bleeding, perforation, pain, infection, missed polyp(s). Benefits, and alternatives of Colonoscopy discussed with patient.  All questions answered.  Consent verified.         Sharif Lopez MD  07/21/23 13:29 EDT

## 2023-09-18 ENCOUNTER — OFFICE VISIT (OUTPATIENT)
Dept: INTERNAL MEDICINE | Facility: CLINIC | Age: 48
End: 2023-09-18
Payer: COMMERCIAL

## 2023-09-18 VITALS
SYSTOLIC BLOOD PRESSURE: 152 MMHG | HEART RATE: 93 BPM | OXYGEN SATURATION: 95 % | WEIGHT: 181 LBS | TEMPERATURE: 98.7 F | DIASTOLIC BLOOD PRESSURE: 99 MMHG | HEIGHT: 65 IN | BODY MASS INDEX: 30.16 KG/M2

## 2023-09-18 DIAGNOSIS — I10 ESSENTIAL HYPERTENSION: ICD-10-CM

## 2023-09-18 DIAGNOSIS — Z11.3 ROUTINE SCREENING FOR STI (SEXUALLY TRANSMITTED INFECTION): ICD-10-CM

## 2023-09-18 DIAGNOSIS — F43.21 ADJUSTMENT DISORDER WITH DEPRESSED MOOD: Primary | ICD-10-CM

## 2023-09-18 LAB — HIV 1+2 AB+HIV1 P24 AG SERPL QL IA: NORMAL

## 2023-09-18 PROCEDURE — G0432 EIA HIV-1/HIV-2 SCREEN: HCPCS | Performed by: STUDENT IN AN ORGANIZED HEALTH CARE EDUCATION/TRAINING PROGRAM

## 2023-09-18 PROCEDURE — 87491 CHLMYD TRACH DNA AMP PROBE: CPT | Performed by: STUDENT IN AN ORGANIZED HEALTH CARE EDUCATION/TRAINING PROGRAM

## 2023-09-18 PROCEDURE — 87661 TRICHOMONAS VAGINALIS AMPLIF: CPT | Performed by: STUDENT IN AN ORGANIZED HEALTH CARE EDUCATION/TRAINING PROGRAM

## 2023-09-18 PROCEDURE — 87591 N.GONORRHOEAE DNA AMP PROB: CPT | Performed by: STUDENT IN AN ORGANIZED HEALTH CARE EDUCATION/TRAINING PROGRAM

## 2023-09-18 PROCEDURE — 86592 SYPHILIS TEST NON-TREP QUAL: CPT | Performed by: STUDENT IN AN ORGANIZED HEALTH CARE EDUCATION/TRAINING PROGRAM

## 2023-09-18 NOTE — PROGRESS NOTES
"Chief Complaint  Depression (Wants to start depression meds)    Subjective          Isidoro Estrella presents to [facility]  History of Present Illness    Since last seen, reports that he discovered his girlfriend was cheating on him, and they have broken up (approximately one month ago).  She has moved in with her other partner.  Mr Estrella reports that he is seeking full custody of the daughter they have together.    He reports this past month feeling quite depressed.  He denies SI as such, but reports that at the start he had thoughts of wanting to go to sleep and not wake up.    He has never attempted suicide in the past.  He denies the presence of guns in the home.    S: having trouble getting to sleep.  Gets 2-3 hours sleep a night  I: lost interest in usual hobbies and pasttimes  G: endorses feelings of guilt and worthlessness  E: feels \"exhausted\"  C: having to put in more effort to concentrate  A: has lost appetite, has lost weight in the last month  P: denies psychomotor slowing  S: denies SI    PHQ-9 Depression Screening  Little interest or pleasure in doing things? 2-->more than half the days   Feeling down, depressed, or hopeless? 3-->nearly every day   Trouble falling or staying asleep, or sleeping too much? 3-->nearly every day   Feeling tired or having little energy? 3-->nearly every day   Poor appetite or overeating? 3-->nearly every day   Feeling bad about yourself - or that you are a failure or have let yourself or your family down? 3-->nearly every day   Trouble concentrating on things, such as reading the newspaper or watching television? 2-->more than half the days   Moving or speaking so slowly that other people could have noticed? Or the opposite - being so fidgety or restless that you have been moving around a lot more than usual? 2-->more than half the days   Thoughts that you would be better off dead, or of hurting yourself in some way? " "1-->several days   PHQ-9 Total Score 22   If you checked off any problems, how difficult have these problems made it for you to do your work, take care of things at home, or get along with other people? very difficult     Elevated BP noted.  He reports he hasn't taken his BP medicine the last two days.      Current Outpatient Medications   Medication Instructions    amLODIPine-benazepril (Lotrel) 10-20 MG per capsule 1 capsule, Oral, Daily    cetirizine (ZYRTEC) 10 mg, Oral, Daily    Cholecalciferol 2,000 Units, Oral, Daily    Elastic Bandages & Supports (Wrist Brace Deluxe) misc 1 Units, Does not apply, Nightly    sertraline (ZOLOFT) 50 mg, Oral, Daily       The following portions of the patient's history were reviewed and updated as appropriate: allergies, current medications, past family history, past medical history, past social history, past surgical history, and problem list.    Objective   Vital Signs:   /99 (BP Location: Left arm, Patient Position: Sitting, Cuff Size: Adult)   Pulse 93   Temp 98.7 °F (37.1 °C) (Temporal)   Ht 165.1 cm (65\")   Wt 82.1 kg (181 lb)   SpO2 95%   BMI 30.12 kg/m²     BP Readings from Last 3 Encounters:   09/18/23 152/99   07/21/23 120/83   06/02/23 133/90     Wt Readings from Last 3 Encounters:   09/18/23 82.1 kg (181 lb)   07/21/23 87.6 kg (193 lb 2 oz)   06/02/23 90.1 kg (198 lb 9.6 oz)     Physical Exam  Vitals reviewed.   Constitutional:       General: He is not in acute distress.     Appearance: Normal appearance. He is not ill-appearing, toxic-appearing or diaphoretic.   HENT:      Head: Normocephalic and atraumatic.      Right Ear: External ear normal.      Left Ear: External ear normal.   Eyes:      Conjunctiva/sclera: Conjunctivae normal.   Cardiovascular:      Rate and Rhythm: Normal rate and regular rhythm.      Pulses: Normal pulses.      Heart sounds: Normal heart sounds. No murmur heard.    No friction rub. No gallop.   Pulmonary:      Effort: Pulmonary " effort is normal. No respiratory distress.      Breath sounds: Normal breath sounds. No stridor. No wheezing, rhonchi or rales.   Chest:      Chest wall: No tenderness.   Abdominal:      General: Abdomen is flat.      Palpations: Abdomen is soft. There is no mass.      Tenderness: There is no abdominal tenderness.   Musculoskeletal:      Right lower leg: No edema.      Left lower leg: No edema.   Skin:     General: Skin is warm and dry.   Neurological:      General: No focal deficit present.      Mental Status: He is alert. Mental status is at baseline.   Psychiatric:         Mood and Affect: Mood is depressed. Affect is tearful.         Speech: Speech normal.         Behavior: Behavior normal.         Thought Content: Thought content normal. Thought content does not include suicidal ideation.         Judgment: Judgment normal.       Result Review :   The following data was reviewed by: Cirilo Oneal MD on 09/18/2023:  Common labs          9/28/2022    10:05 3/3/2023    16:28 6/2/2023    16:42   Common Labs   Glucose 101  123  111    BUN 13  17  23    Creatinine 0.99  1.00  1.08    Sodium 137  138  137    Potassium 3.8  3.5  3.7    Chloride 100  99  103    Calcium 9.4  9.6  9.3    Albumin 4.30  4.5  4.3    Total Bilirubin 0.4  0.4  0.6    Alkaline Phosphatase 85  84  78    AST (SGOT) 31  31  36    ALT (SGPT) 49  53  48    WBC  6.16     Hemoglobin  15.6     Hematocrit  45.8     Platelets  237     Total Cholesterol  214     Triglycerides  282     HDL Cholesterol  43     LDL Cholesterol   121     Hemoglobin A1C 5.70  5.80  6.00             Lab Results   Component Value Date    BILIRUBINUR Negative 06/27/2022       Procedures        Assessment and Plan    Diagnoses and all orders for this visit:    1. Adjustment disorder with depressed mood (Primary)  -     sertraline (Zoloft) 50 MG tablet; Take 1 tablet by mouth Daily.  Dispense: 30 tablet; Refill: 2  -     Ambulatory Referral to Psychology    2. Essential  hypertension    3. Routine screening for STI (sexually transmitted infection)  -     HIV-1 / O / 2 Ag / Antibody  -     Chlamydia trachomatis, Neisseria gonorrhoeae, Trichomonas vaginalis, PCR - Urine, Urine, Clean Catch  -     RPR      Adjusment Disorder:  -denies SI  -discussed the risks and benefits of SSRI, including the black box warning for suicide risk as well as the available data (counseling that in my opinion the benefits outweigh the risks)    HTN:  -above goal, hasn't been taking medicine last two days  -encouraged to take medicine more consistently    There are no discontinued medications.       Follow Up   Return in 1 month (on 10/18/2023) for adjustment disorder.  Patient was given instructions and counseling regarding his condition or for health maintenance advice. Please see specific information pulled into the AVS if appropriate.       Cirilo Oneal MD  09/18/23  17:22 EDT

## 2023-09-19 LAB — RPR SER QL: NORMAL

## 2023-09-21 LAB
C TRACH RRNA SPEC QL NAA+PROBE: NEGATIVE
N GONORRHOEA RRNA SPEC QL NAA+PROBE: NEGATIVE
T VAGINALIS RRNA SPEC QL NAA+PROBE: NEGATIVE

## 2023-09-22 ENCOUNTER — TELEPHONE (OUTPATIENT)
Dept: INTERNAL MEDICINE | Facility: CLINIC | Age: 48
End: 2023-09-22
Payer: COMMERCIAL

## 2023-09-22 NOTE — TELEPHONE ENCOUNTER
Caller: Mundo Estrella    Relationship: Self    Best call back number: [phone]     What is the best time to reach you: ANY     Who are you requesting to speak with (clinical staff, provider,  specific staff member): CLINICAL     Do you know the name of the person who called: MUNDO     What was the call regarding: PATIENT IS REQUESTING TO SPEAK WITH HIS PROVIDER ONLY REGARDING HIS MEDICAL CONCERN.     PLEASE ADVISE     Is it okay if the provider responds through MyChart: NO

## 2023-09-22 NOTE — TELEPHONE ENCOUNTER
Patient called requesting to only speak with Summer. Informed patient that Summer was not in office, would not speak with anyone else in clinic.

## 2023-10-03 ENCOUNTER — TELEPHONE (OUTPATIENT)
Dept: INTERNAL MEDICINE | Facility: CLINIC | Age: 48
End: 2023-10-03
Payer: COMMERCIAL

## 2023-10-03 DIAGNOSIS — I10 ESSENTIAL HYPERTENSION: ICD-10-CM

## 2023-10-03 NOTE — TELEPHONE ENCOUNTER
Caller: Isidoro Estrella    Relationship: Self    Best call back number: [phone]    Requested Prescriptions:   Requested Prescriptions     Pending Prescriptions Disp Refills    amLODIPine-benazepril (Lotrel) 10-20 MG per capsule 90 capsule 2     Sig: Take 1 capsule by mouth Daily.        Pharmacy where request should be sent: [id number] - [place], [place] - [address] AT [facility] [phone] [facility] [phone]      Last office visit with prescribing clinician: 9/18/2023   Last telemedicine visit with prescribing clinician: Visit date not found   Next office visit with prescribing clinician: 10/18/2023       Does the patient have less than a 3 day supply:  [x] Yes  [] No    Would you like a call back once the refill request has been completed: [] Yes [] No    If the office needs to give you a call back, can they leave a voicemail: [] Yes [] No    Sandra Crocker Rep   10/03/23 10:58 EDT

## 2023-10-06 RX ORDER — AMLODIPINE BESYLATE AND BENAZEPRIL HYDROCHLORIDE 10; 20 MG/1; MG/1
1 CAPSULE ORAL DAILY
Qty: 90 CAPSULE | Refills: 2 | Status: SHIPPED | OUTPATIENT
Start: 2023-10-06

## 2023-10-18 ENCOUNTER — OFFICE VISIT (OUTPATIENT)
Dept: INTERNAL MEDICINE | Facility: CLINIC | Age: 48
End: 2023-10-18
Payer: COMMERCIAL

## 2023-10-18 VITALS
WEIGHT: 185.8 LBS | BODY MASS INDEX: 30.96 KG/M2 | HEIGHT: 65 IN | SYSTOLIC BLOOD PRESSURE: 121 MMHG | DIASTOLIC BLOOD PRESSURE: 80 MMHG | TEMPERATURE: 98.6 F | OXYGEN SATURATION: 98 % | HEART RATE: 72 BPM

## 2023-10-18 DIAGNOSIS — I10 ESSENTIAL HYPERTENSION: ICD-10-CM

## 2023-10-18 DIAGNOSIS — F43.21 ADJUSTMENT DISORDER WITH DEPRESSED MOOD: Primary | ICD-10-CM

## 2023-10-18 DIAGNOSIS — G56.03 BILATERAL CARPAL TUNNEL SYNDROME: ICD-10-CM

## 2023-10-18 RX ORDER — SERTRALINE HYDROCHLORIDE 25 MG/1
TABLET, FILM COATED ORAL
Qty: 90 TABLET | Refills: 2 | Status: SHIPPED | OUTPATIENT
Start: 2023-10-18

## 2023-10-18 NOTE — PROGRESS NOTES
Chief Complaint  Depression (Follow up)    Subjective          Isidoro Estrella presents to [facility]  History of Present Illness    In custody over[place] with ex-girlfriend (they have a 2 year old daughter together).  Had a restraining order against him, but it was lifted this past Monday.  Ex-girlfriend refusing to allow him visitation at present, although he does have a legal right to see his daughter.    He denies SI.  Doing somewhat better compared to last visit.    Still having issues with bilateral thumb pain.  PT didn't help.  Carpal tunnel braces did help somewhat.  Amenable to referral to hand surgery for evaluation.    PHQ-9 Depression Screening  Little interest or pleasure in doing things?     Feeling down, depressed, or hopeless?     Trouble falling or staying asleep, or sleeping too much?     Feeling tired or having little energy?     Poor appetite or overeating?     Feeling bad about yourself - or that you are a failure or have let yourself or your family down?     Trouble concentrating on things, such as reading the newspaper or watching television?     Moving or speaking so slowly that other people could have noticed? Or the opposite - being so fidgety or restless that you have been moving around a lot more than usual?     Thoughts that you would be better off dead, or of hurting yourself in some way?     PHQ-9 Total Score     If you checked off any problems, how difficult have these problems made it for you to do your work, take care of things at home, or get along with other people?           Current Outpatient Medications   Medication Instructions    amLODIPine-benazepril (Lotrel) 10-20 MG per capsule 1 capsule, Oral, Daily    cetirizine (ZYRTEC) 10 mg, Oral, Daily    Cholecalciferol 2,000 Units, Oral, Daily    Elastic Bandages & Supports (Wrist Brace Deluxe) misc 1 Units, Does not apply, Nightly    sertraline (Zoloft) 25 MG tablet Take 75 mg by mouth  "daily (one 50 mg tab, and one 25 mg tab)    sertraline (Zoloft) 50 MG tablet Take 75 mg by mouth daily (one 50 mg tab, and one 25 mg tab)       The following portions of the patient's history were reviewed and updated as appropriate: allergies, current medications, past family history, past medical history, past social history, past surgical history, and problem list.    Objective   Vital Signs:   /80 (BP Location: Right arm, Patient Position: Sitting, Cuff Size: Adult)   Pulse 72   Temp 98.6 °F (37 °C) (Temporal)   Ht 165.1 cm (65\")   Wt 84.3 kg (185 lb 12.8 oz)   SpO2 98%   BMI 30.92 kg/m²     BP Readings from Last 3 Encounters:   10/18/23 121/80   09/18/23 152/99   07/21/23 120/83     Wt Readings from Last 3 Encounters:   10/18/23 84.3 kg (185 lb 12.8 oz)   09/18/23 82.1 kg (181 lb)   07/21/23 87.6 kg (193 lb 2 oz)           Physical Exam  Vitals reviewed.   Constitutional:       General: He is not in acute distress.     Appearance: Normal appearance. He is not ill-appearing, toxic-appearing or diaphoretic.   HENT:      Head: Normocephalic and atraumatic.      Right Ear: External ear normal.      Left Ear: External ear normal.   Eyes:      Conjunctiva/sclera: Conjunctivae normal.   Cardiovascular:      Rate and Rhythm: Normal rate and regular rhythm.      Pulses: Normal pulses.      Heart sounds: Normal heart sounds. No murmur heard.     No friction rub. No gallop.   Pulmonary:      Effort: Pulmonary effort is normal. No respiratory distress.      Breath sounds: Normal breath sounds. No stridor. No wheezing, rhonchi or rales.   Chest:      Chest wall: No tenderness.   Abdominal:      General: Abdomen is flat.      Palpations: Abdomen is soft. There is no mass.      Tenderness: There is no abdominal tenderness.   Musculoskeletal:      Right lower leg: No edema.      Left lower leg: No edema.   Skin:     General: Skin is warm and dry.   Neurological:      General: No focal deficit present.      Mental " Status: He is alert. Mental status is at baseline.   Psychiatric:         Behavior: Behavior normal.         Thought Content: Thought content normal.         Judgment: Judgment normal.       Result Review :   The following data was reviewed by: Cirilo Oneal MD on 10/18/2023:  Common labs          3/3/2023    16:28 6/2/2023    16:42   Common Labs   Glucose 123  111    BUN 17  23    Creatinine 1.00  1.08    Sodium 138  137    Potassium 3.5  3.7    Chloride 99  103    Calcium 9.6  9.3    Albumin 4.5  4.3    Total Bilirubin 0.4  0.6    Alkaline Phosphatase 84  78    AST (SGOT) 31  36    ALT (SGPT) 53  48    WBC 6.16     Hemoglobin 15.6     Hematocrit 45.8     Platelets 237     Total Cholesterol 214     Triglycerides 282     HDL Cholesterol 43     LDL Cholesterol  121     Hemoglobin A1C 5.80  6.00             Lab Results   Component Value Date    BILIRUBINUR Negative 06/27/2022       Procedures        Assessment and Plan    Diagnoses and all orders for this visit:    1. Adjustment disorder with depressed mood (Primary)  -     sertraline (Zoloft) 50 MG tablet; Take 75 mg by mouth daily (one 50 mg tab, and one 25 mg tab)  Dispense: 90 tablet; Refill: 2  -     sertraline (Zoloft) 25 MG tablet; Take 75 mg by mouth daily (one 50 mg tab, and one 25 mg tab)  Dispense: 90 tablet; Refill: 2    2. Essential hypertension    3. Bilateral carpal tunnel syndrome  -     Ambulatory Referral to Hand Surgery      Adjustment Disorder:  -improved somewhat  -will increase to 75 mg zoloft    HTN:  -BP at goal  -will continue lotrel    Carpal tunnel:  -hand surgery referral placed    Medications Discontinued During This Encounter   Medication Reason    sertraline (Zoloft) 50 MG tablet Reorder          Follow Up   Return in about 3 months (around 1/18/2024), or if symptoms worsen or fail to improve.  Patient was given instructions and counseling regarding his condition or for health maintenance advice. Please see specific information pulled  into the AVS if appropriate.       Cirilo Oneal MD  10/18/23  16:59 EDT

## 2023-12-03 PROBLEM — I10 ESSENTIAL HYPERTENSION: Status: ACTIVE | Noted: 2023-12-03

## 2023-12-03 PROBLEM — E78.2 MIXED HYPERLIPIDEMIA: Status: ACTIVE | Noted: 2023-12-03

## 2023-12-03 PROBLEM — R73.03 PREDIABETES: Status: ACTIVE | Noted: 2023-12-03

## 2023-12-03 NOTE — PATIENT INSTRUCTIONS
Cooking With Less Salt  Cooking with less salt is one way to reduce the amount of sodium you get from food. Sodium is one of the elements that make up salt. It is found naturally in foods and is also added to certain foods. Depending on your condition and overall health, your health care provider or dietitian may recommend that you reduce your sodium intake. Most people should have less than 2,300 milligrams (mg) of sodium each day. If you have high blood pressure (hypertension), you may need to limit your sodium to 1,500 mg each day. Follow the tips below to help reduce your sodium intake.  What are tips for eating less sodium?  Reading food labels       Check the food label before buying or using packaged ingredients. Always check the label for the serving size and sodium content.  Look for products with no more than 140 mg of sodium in one serving.  Check the % Daily Value column to see what percent of the daily recommended amount of sodium is provided in one serving of the product. Foods with 5% or less in this column are considered low in sodium. Foods with 20% or higher are considered high in sodium.  Do not choose foods with salt as one of the first three ingredients on the ingredients list. If salt is one of the first three ingredients, it usually means the item is high in sodium.     Shopping  Buy sodium-free or low-sodium products. Look for the following words on food labels:  Low-sodium.  Sodium-free.  Reduced-sodium.  No salt added.  Unsalted.  Always check the sodium content even if foods are labeled as low-sodium or no salt added.  Buy fresh foods.  Cooking  Use herbs, seasonings without salt, and spices as substitutes for salt.  Use sodium-free baking soda when baking.  [place], braise, or roast foods to add flavor with less salt.  Avoid adding salt to pasta, rice, or hot cereals.  Drain and rinse canned vegetables, beans, and meat before use.  Avoid adding salt when cooking sweets and desserts.  Cook  "with low-sodium ingredients.  What foods are high in sodium?  Vegetables  Regular canned vegetables (not low-sodium or reduced-sodium). Sauerkraut, pickled vegetables, and relishes. Olives. French fries. Onion rings. Regular canned tomato sauce and paste. Regular tomato and vegetable juice. Frozen vegetables in sauces.  Grains  Instant hot cereals. Bread stuffing, pancake, and biscuit mixes. Croutons. Seasoned rice or pasta mixes. Noodle soup cups. Boxed or frozen macaroni and cheese. Regular salted crackers. Self-rising flour. Rolls. Bagels. Flour tortillas and wraps.  Meats and other proteins  Meat or fish that is salted, canned, smoked, cured, spiced, or pickled. This includes camacho, ham, sausages, hot dogs, corned beef, chipped beef, meat loaves, salt pork, jerky, pickled herring, anchovies, regular canned tuna, and sardines. Salted nuts.  Dairy  Processed cheese and cheese spreads. Cheese curds. Blue cheese. Feta cheese. String cheese. Regular cottage cheese. Buttermilk. Canned milk.  The items listed above may not be a complete list of foods high in sodium. Actual amounts of sodium may be different depending on processing. Contact a dietitian for more information.  What foods are low in sodium?  Fruits  Fresh, frozen, or canned fruit with no sauce added. Fruit juice.  Vegetables  Fresh or frozen vegetables with no sauce added. \"No salt added\" canned vegetables. \"No salt added\" tomato sauce and paste. Low-sodium or reduced-sodium tomato and vegetable juice.  Grains  Noodles, pasta, quinoa, rice. Shredded or puffed wheat or puffed rice. Regular or quick oats (not instant). Low-sodium crackers. Low-sodium bread. Whole-grain bread and whole-grain pasta. Unsalted popcorn.  Meats and other proteins  Fresh or frozen whole meats, poultry (not injected with sodium), and fish with no sauce added. Unsalted nuts. Dried peas, beans, and lentils without added salt. Unsalted canned beans. Eggs. Unsalted nut butters. " Low-sodium canned tuna or chicken.  Dairy  Milk. Soy milk. Yogurt. Low-sodium cheeses, such as Swiss, [place], mozzarella, and ricotta. Sherbet or ice cream (keep to ½ cup per serving). Cream cheese.  Fats and oils  Unsalted butter or margarine.  Other foods  Homemade pudding. Sodium-free baking soda and baking powder. Herbs and spices. Low-sodium seasoning mixes.  Beverages  Coffee and tea. Carbonated beverages.  The items listed above may not be a complete list of foods low in sodium. Actual amounts of sodium may be different depending on processing. Contact a dietitian for more information.  What are some salt alternatives when cooking?  The following are herbs, seasonings, and spices that can be used instead of salt to flavor your food. Herbs should be fresh or dried. Do not choose packaged mixes. Next to the name of the herb, spice, or seasoning are some examples of foods you can pair it with.  Herbs  Bay leaves - Soups, meat and vegetable dishes, and spaghetti sauce.  Basil - Italian dishes, soups, pasta, and fish dishes.  Cilantro - Meat, poultry, and vegetable dishes.  Chili powder - Marinades and Mexican dishes.  Chives - Salad dressings and potato dishes.  Cumin - Mexican dishes, couscous, and meat dishes.  Dill - Fish dishes, sauces, and salads.  Fennel - Meat and vegetable dishes, breads, and cookies.  Garlic (do not use garlic salt) - Italian dishes, meat dishes, salad dressings, and sauces.  Marjoram - Soups, potato dishes, and meat dishes.  Oregano - Pizza and spaghetti sauce.  Parsley - Salads, soups, pasta, and meat dishes.  Carina - Italian dishes, salad dressings, soups, and red meats.  Saffron - Fish dishes, pasta, and some poultry dishes.  Branden - Stuffings and sauces.  Tarragon - Fish and poultry dishes.  Thyme - Stuffing, meat, and fish dishes.  Seasonings  Lemon juice - Fish dishes, poultry dishes, vegetables, and salads.  Vinegar - Salad dressings, vegetables, and fish  "dishes.  Spices  Cinnamon - Sweet dishes, such as cakes, cookies, and puddings.  Cloves - Gingerbread, puddings, and marinades for meats.  [place]ho - Vegetable dishes, fish and poultry dishes, and stir-miramontes dishes.  Joyce - Vegetable dishes, fish dishes, and stir-miramontes dishes.  Nutmeg - Pasta, vegetables, poultry, fish dishes, and custard.  Summary  Cooking with less salt is one way to reduce the amount of sodium that you get from food.  Buy sodium-free or low-sodium products.  Check the food label before using or buying packaged ingredients.  Use herbs, seasonings without salt, and spices as substitutes for salt in foods.  This information is not intended to replace advice given to you by your health care provider. Make sure you discuss any questions you have with your health care provider.  Document Revised: 12/09/2020 Document Reviewed: 12/09/2020  [employer] Patient Education © 2021 [employer]      https://www.nhlbi.nih.gov/files/docs/public/heart/dash_brief.pdf\">   DASH Eating Plan  DASH stands for Dietary Approaches to Stop Hypertension. The DASH eating plan is a healthy eating plan that has been shown to:  Reduce high blood pressure (hypertension).  Reduce your risk for type 2 diabetes, heart disease, and stroke.  Help with weight loss.  What are tips for following this plan?  Reading food labels  Check food labels for the amount of salt (sodium) per serving. Choose foods with less than 5 percent of the Daily Value of sodium. Generally, foods with less than 300 milligrams (mg) of sodium per serving fit into this eating plan.  To find whole grains, look for the word \"whole\" as the first word in the ingredient list.  Shopping  Buy products labeled as \"low-sodium\" or \"no salt added.\"  Buy fresh foods. Avoid canned foods and pre-made or frozen meals.  Cooking  Avoid adding salt when cooking. Use salt-free seasonings or herbs instead of table salt or sea salt. Check with your health care provider or pharmacist before " using salt substitutes.  Do not miramontes foods. Cook foods using healthy methods such as baking, boiling, grilling, roasting, and broiling instead.  Cook with heart-healthy oils, such as olive, canola, avocado, soybean, or sunflower oil.  Meal planning       Eat a balanced diet that includes:  4 or more servings of fruits and 4 or more servings of vegetables each day. Try to fill one-half of your plate with fruits and vegetables.  6-8 servings of whole grains each day.  Less than 6 oz (170 g) of lean meat, poultry, or fish each day. A 3-oz (85-g) serving of meat is about the same size as a deck of cards. One egg equals 1 oz (28 g).  2-3 servings of low-fat dairy each day. One serving is 1 cup (237 mL).  1 serving of nuts, seeds, or beans 5 times each week.  2-3 servings of heart-healthy fats. Healthy fats called omega-3 fatty acids are found in foods such as walnuts, flaxseeds, fortified milks, and eggs. These fats are also found in cold-water fish, such as sardines, salmon, and mackerel.  Limit how much you eat of:  Canned or prepackaged foods.  Food that is high in trans fat, such as some fried foods.  Food that is high in saturated fat, such as fatty meat.  Desserts and other sweets, sugary drinks, and other foods with added sugar.  Full-fat dairy products.  Do not salt foods before eating.  Do not eat more than 4 egg yolks a week.  Try to eat at least 2 vegetarian meals a week.  Eat more home-cooked food and less restaurant, buffet, and fast food.     Lifestyle  When eating at a restaurant, ask that your food be prepared with less salt or no salt, if possible.  If you drink alcohol:  Limit how much you use to:  0-1 drink a day for women who are not pregnant.  0-2 drinks a day for men.  Be aware of how much alcohol is in your drink. In the [place] one drink equals one 12 oz bottle of beer (355 mL), one 5 oz glass of wine (148 mL), or one 1½ oz glass of hard liquor (44 mL).  General information  Avoid eating more than  2,300 mg of salt a day. If you have hypertension, you may need to reduce your sodium intake to 1,500 mg a day.  Work with your health care provider to maintain a healthy body weight or to lose weight. Ask what an ideal weight is for you.  Get at least 30 minutes of exercise that causes your heart to beat faster (aerobic exercise) most days of the week. Activities may include walking, swimming, or biking.  Work with your health care provider or dietitian to adjust your eating plan to your individual calorie needs.  What foods should I eat?  Fruits  All fresh, dried, or frozen fruit. Canned fruit in natural juice (without added sugar).  Vegetables  Fresh or frozen vegetables (raw, steamed, roasted, or grilled). Low-sodium or reduced-sodium tomato and vegetable juice. Low-sodium or reduced-sodium tomato sauce and tomato paste. Low-sodium or reduced-sodium canned vegetables.  Grains  Whole-grain or whole-wheat bread. Whole-grain or whole-wheat pasta. Brown rice. Oatmeal. Quinoa. Bulgur. Whole-grain and low-sodium cereals. [place]a bread. Low-fat, low-sodium crackers. Whole-wheat flour tortillas.  Meats and other proteins  Skinless chicken or turkey. Ground chicken or turkey. Pork with fat trimmed off. Fish and seafood. Egg whites. Dried beans, peas, or lentils. Unsalted nuts, nut butters, and seeds. Unsalted canned beans. Lean cuts of beef with fat trimmed off. Low-sodium, lean precooked or cured meat, such as sausages or meat loaves.  Dairy  Low-fat (1%) or fat-free (skim) milk. Reduced-fat, low-fat, or fat-free cheeses. Nonfat, low-sodium ricotta or cottage cheese. Low-fat or nonfat yogurt. Low-fat, low-sodium cheese.  Fats and oils  Soft margarine without trans fats. Vegetable oil. Reduced-fat, low-fat, or light mayonnaise and salad dressings (reduced-sodium). Canola, safflower, olive, avocado, soybean, and sunflower oils. Avocado.  Seasonings and condiments  Herbs. Spices. Seasoning mixes without salt.  Other  foods  Unsalted popcorn and pretzels. Fat-free sweets.  The items listed above may not be a complete list of foods and beverages you can eat. Contact a dietitian for more information.  What foods should I avoid?  Fruits  Canned fruit in a light or heavy syrup. Fried fruit. Fruit in cream or butter sauce.  Vegetables  Creamed or fried vegetables. Vegetables in a cheese sauce. Regular canned vegetables (not low-sodium or reduced-sodium). Regular canned tomato sauce and paste (not low-sodium or reduced-sodium). Regular tomato and vegetable juice (not low-sodium or reduced-sodium). Pickles. Olives.  Grains  Baked goods made with fat, such as croissants, muffins, or some breads. Dry pasta or rice meal packs.  Meats and other proteins  Fatty cuts of meat. Ribs. Fried meat. [place]. Bologna, salami, and other precooked or cured meats, such as sausages or meat loaves. Fat from the back of a pig (fatback). Bratwurst. Salted nuts and seeds. Canned beans with added salt. Canned or smoked fish. Whole eggs or egg yolks. Chicken or turkey with skin.  Dairy  Whole or 2% milk, cream, and half-and-half. Whole or full-fat cream cheese. Whole-fat or sweetened yogurt. Full-fat cheese. Nondairy creamers. Whipped toppings. Processed cheese and cheese spreads.  Fats and oils  Butter. Stick margarine. Lard. Shortening. Ghee. [place]jes[place] fat. Tropical oils, such as coconut, palm kernel, or palm oil.  Seasonings and condiments  Onion salt, garlic salt, seasoned salt, table salt, and sea salt. Worcestershire sauce. Tartar sauce. Barbecue sauce. Teriyaki sauce. Soy sauce, including reduced-sodium. Steak sauce. Canned and packaged gravies. Fish sauce. Oyster sauce. Cocktail sauce. Store-bought horseradish. Ketchup. Mustard. Meat flavorings and tenderizers. Bouillon cubes. Hot sauces. Pre-made or packaged marinades. Pre-made or packaged taco seasonings. Relishes. Regular salad dressings.  Other foods  Salted popcorn and pretzels.  The items listed above  may not be a complete list of foods and beverages you should avoid. Contact a dietitian for more information.  Where to find more information  National Heart, Lung, and Blood [place]: www.nhlbi.nih.gov  American Heart Association: www.heart.org  Academy of Nutrition and Dietetics: www.eatright.org  National Kidney Foundation: www.kidney.org  Summary  The DASH eating plan is a healthy eating plan that has been shown to reduce high blood pressure (hypertension). It may also reduce your risk for type 2 diabetes, heart disease, and stroke.  When on the DASH eating plan, aim to eat more fresh fruits and vegetables, whole grains, lean proteins, low-fat dairy, and heart-healthy fats.  With the DASH eating plan, you should limit salt (sodium) intake to 2,300 mg a day. If you have hypertension, you may need to reduce your sodium intake to 1,500 mg a day.  Work with your health care provider or dietitian to adjust your eating plan to your individual calorie needs.  This information is not intended to replace advice given to you by your health care provider. Make sure you discuss any questions you have with your health care provider.  Document Revised: 11/20/2020 Document Reviewed: 11/20/2020  [employer] Patient Education © 2021 [employer].       Heart-Healthy Eating Plan  Heart-healthy meal planning includes:  Eating less unhealthy fats.  Eating more healthy fats.  Making other changes in your diet.  Talk with your doctor or a diet specialist (dietitian) to create an eating plan that is right for you.  What is my plan?  Your doctor may recommend an eating plan that includes:  Total fat: ______% or less of total calories a day.  Saturated fat: ______% or less of total calories a day.  Cholesterol: less than _________mg a day.  What are tips for following this plan?  Cooking  Avoid frying your food. Try to bake, boil, grill, or broil it instead. You can also reduce fat by:  Removing the skin from poultry.  Removing all  visible fats from meats.  Steaming vegetables in water or broth.  Meal planning       At meals, divide your plate into four equal parts:  Fill one-half of your plate with vegetables and green salads.  Fill one-fourth of your plate with whole grains.  Fill one-fourth of your plate with lean protein foods.  Eat 4-5 servings of vegetables per day. A serving of vegetables is:  1 cup of raw or cooked vegetables.  2 cups of raw leafy greens.  Eat 4-5 servings of fruit per day. A serving of fruit is:  1 medium whole fruit.  ¼ cup of dried fruit.  ½ cup of fresh, frozen, or canned fruit.  ½ cup of 100% fruit juice.  Eat more foods that have soluble fiber. These are apples, broccoli, carrots, beans, peas, and barley. Try to get 20-30 g of fiber per day.  Eat 4-5 servings of nuts, legumes, and seeds per week:  1 serving of dried beans or legumes equals ½ cup after being cooked.  1 serving of nuts is ¼ cup.  1 serving of seeds equals 1 tablespoon.     General information  Eat more home-cooked food. Eat less restaurant, buffet, and fast food.  Limit or avoid alcohol.  Limit foods that are high in starch and sugar.  Avoid fried foods.  Lose weight if you are overweight.  Keep track of how much salt (sodium) you eat. This is important if you have high blood pressure. Ask your doctor to tell you more about this.  Try to add vegetarian meals each week.  Fats  Choose healthy fats. These include olive oil and canola oil, flaxseeds, walnuts, almonds, and seeds.  Eat more omega-3 fats. These include salmon, mackerel, sardines, tuna, flaxseed oil, and ground flaxseeds. Try to eat fish at least 2 times each week.  Check food labels. Avoid foods with trans fats or high amounts of saturated fat.  Limit saturated fats.  These are often found in animal products, such as meats, butter, and cream.  These are also found in plant foods, such as palm oil, palm kernel oil, and coconut oil.  Avoid foods with partially hydrogenated oils in them.  These have trans fats. Examples are stick margarine, some tub margarines, cookies, crackers, and other baked goods.  What foods can I eat?  Fruits  All fresh, canned (in natural juice), or frozen fruits.  Vegetables  Fresh or frozen vegetables (raw, steamed, roasted, or grilled). Green salads.  Grains  Most grains. Choose whole wheat and whole grains most of the time. Rice and pasta, including brown rice and pastas made with whole wheat.  Meats and other proteins  Lean, well-trimmed beef, veal, pork, and lamb. Chicken and turkey without skin. All fish and shellfish. Wild duck, rabbit, pheasant, and venison. Egg whites or low-cholesterol egg substitutes. Dried beans, peas, lentils, and tofu. Seeds and most nuts.  Dairy  Low-fat or nonfat cheeses, including ricotta and mozzarella. Skim or 1% milk that is liquid, powdered, or evaporated. Buttermilk that is made with low-fat milk. Nonfat or low-fat yogurt.  Fats and oils  Non-hydrogenated (trans-free) margarines. Vegetable oils, including soybean, sesame, sunflower, olive, peanut, safflower, corn, canola, and cottonseed. Salad dressings or mayonnaise made with a vegetable oil.  Beverages  Mineral water. Coffee and tea. Diet carbonated beverages.  Sweets and desserts  Sherbet, gelatin, and fruit ice. Small amounts of dark chocolate.  Limit all sweets and desserts.  Seasonings and condiments  All seasonings and condiments.  The items listed above may not be a complete list of foods and drinks you can eat. Contact a dietitian for more options.  What foods should I avoid?  Fruits  Canned fruit in heavy syrup. Fruit in cream or butter sauce. Fried fruit. Limit coconut.  Vegetables  Vegetables cooked in cheese, cream, or butter sauce. Fried vegetables.  Grains  Breads that are made with saturated or trans fats, oils, or whole milk. Croissants. Sweet rolls. Donuts. High-fat crackers, such as cheese crackers.  Meats and other proteins  Fatty meats, such as hot dogs, ribs,  sausage, camacho, rib-eye roast or steak. High-fat deli meats, such as salami and bologna. Caviar. Domestic duck and goose. Organ meats, such as liver.  Dairy  Cream, sour cream, cream cheese, and creamed cottage cheese. Whole-milk cheeses. Whole or 2% milk that is liquid, evaporated, or condensed. Whole buttermilk. Cream sauce or high-fat cheese sauce. Yogurt that is made from whole milk.  Fats and oils  Meat fat, or shortening. Cocoa butter, hydrogenated oils, palm oil, coconut oil, palm kernel oil. Solid fats and shortenings, including camacho fat, salt pork, lard, and butter. Nondairy cream substitutes. Salad dressings with cheese or sour cream.  Beverages  Regular sodas and juice drinks with added sugar.  Sweets and desserts  Frosting. Pudding. Cookies. Cakes. Pies. Milk chocolate or white chocolate. Buttered syrups. Full-fat ice cream or ice cream drinks.  The items listed above may not be a complete list of foods and drinks to avoid. Contact a dietitian for more information.  Summary  Heart-healthy meal planning includes eating less unhealthy fats, eating more healthy fats, and making other changes in your diet.  Eat a balanced diet. This includes fruits and vegetables, low-fat or nonfat dairy, lean protein, nuts and legumes, whole grains, and heart-healthy oils and fats.  This information is not intended to replace advice given to you by your health care provider. Make sure you discuss any questions you have with your health care provider.  Document Revised: 02/21/2019 Document Reviewed: 01/25/2019  [employer]vier Patient Education © 2021 [employer].       Mediterranean Diet  A Mediterranean diet refers to food and lifestyle choices that are based on the traditions of countries located on the [place]. This way of eating has been shown to help prevent certain conditions and improve outcomes for people who have chronic diseases, like kidney disease and heart disease.  What are tips for following this  plan?  Lifestyle  Cook and eat meals together with your family, when possible.  Drink enough fluid to keep your urine clear or pale yellow.  Be physically active every day. This includes:  Aerobic exercise like running or swimming.  Leisure activities like gardening, walking, or housework.  Get 7-8 hours of sleep each night.  If recommended by your health care provider, drink red wine in moderation. This means 1 glass a day for nonpregnant women and 2 glasses a day for men. A glass of wine equals 5 oz (150 mL).  Reading food labels       Check the serving size of packaged foods. For foods such as rice and pasta, the serving size refers to the amount of cooked product, not dry.  Check the total fat in packaged foods. Avoid foods that have saturated fat or trans fats.  Check the ingredients list for added sugars, such as corn syrup.     Shopping  At the grocery store, buy most of your food from the areas near the walls of the store. This includes:  Fresh fruits and vegetables (produce).  Grains, beans, nuts, and seeds. Some of these may be available in unpackaged forms or large amounts (in bulk).  Fresh seafood.  Poultry and eggs.  Low-fat dairy products.  Buy whole ingredients instead of prepackaged foods.  Buy fresh fruits and vegetables in-season from local farmers markets.  Buy frozen fruits and vegetables in resealable bags.  If you do not have access to quality fresh seafood, buy precooked frozen shrimp or canned fish, such as tuna, salmon, or sardines.  Buy small amounts of raw or cooked vegetables, salads, or olives from the deli or salad bar at your store.  Stock your pantry so you always have certain foods on hand, such as olive oil, canned tuna, canned tomatoes, rice, pasta, and beans.  Cooking  Cook foods with extra-virgin olive oil instead of using butter or other vegetable oils.  Have meat as a side dish, and have vegetables or grains as your main dish. This means having meat in small portions or adding  small amounts of meat to foods like pasta or stew.  Use beans or vegetables instead of meat in common dishes like chili or lasagna.  [place] with different cooking methods. Try roasting or broiling vegetables instead of steaming or sautéeing them.  Add frozen vegetables to soups, stews, pasta, or rice.  Add nuts or seeds for added healthy fat at each meal. You can add these to yogurt, salads, or vegetable dishes.  Marinate fish or vegetables using olive oil, lemon juice, garlic, and fresh herbs.  Meal planning       Plan to eat 1 vegetarian meal one day each week. Try to work up to 2 vegetarian meals, if possible.  Eat seafood 2 or more times a week.  Have healthy snacks readily available, such as:  Vegetable sticks with hummus.  Greek yogurt.  Fruit and nut trail mix.  Eat balanced meals throughout the week. This includes:  Fruit: 2-3 servings a day  Vegetables: 4-5 servings a day  Low-fat dairy: 2 servings a day  Fish, poultry, or lean meat: 1 serving a day  Beans and legumes: 2 or more servings a week  Nuts and seeds: 1-2 servings a day  Whole grains: 6-8 servings a day  Extra-virgin olive oil: 3-4 servings a day  Limit red meat and sweets to only a few servings a month     What are my food choices?  Mediterranean diet  Recommended  Grains: Whole-grain pasta. Brown rice. Bulgar wheat. Polenta. Couscous. Whole-wheat bread. Oatmeal. Quinoa.  Vegetables: Artichokes. Beets. Broccoli. Cabbage. Carrots. Eggplant. Green beans. Chard. Kale. Spinach. Onions. Leeks. Peas. Squash. Tomatoes. Peppers. Radishes.  Fruits: Apples. Apricots. Avocado. Berries. Bananas. Cherries. Dates. Figs. Grapes. Vasile. Melon. Oranges. Peaches. Plums. Pomegranate.  Meats and other protein foods: Beans. Almonds. Sunflower seeds. Pine nuts. Peanuts. Cod. [place]. Scallops. Shrimp. Tuna. Tilapia. Clams. Oysters. Eggs.  Dairy: Low-fat milk. Cheese. Greek yogurt.  Beverages: Water. Red wine. Herbal tea.  Fats and oils: Extra virgin olive oil.  Avocado oil. Grape seed oil.  Sweets and desserts: Greek yogurt with honey. Baked apples. Poached pears. Trail mix.  Seasoning and other foods: Basil. Cilantro. Coriander. Cumin. Mint. Parsley. Branden. Rosemary. Tarragon. Garlic. Oregano. Thyme. Pepper. Balsalmic vinegar. Tahini. Hummus. Tomato sauce. Olives. Mushrooms.  Limit these  Grains: Prepackaged pasta or rice dishes. Prepackaged cereal with added sugar.  Vegetables: Deep fried potatoes (french fries).  Fruits: Fruit canned in syrup.  Meats and other protein foods: Beef. Pork. Lamb. Poultry with skin. Hot dogs. Dejesus.  Dairy: Ice cream. Sour cream. Whole milk.  Beverages: Juice. Sugar-sweetened soft drinks. Beer. Liquor and spirits.  Fats and oils: Butter. Canola oil. Vegetable oil. Beef fat (tallow). Lard.  Sweets and desserts: Cookies. Cakes. Pies. Candy.  Seasoning and other foods: Mayonnaise. Premade sauces and marinades.  The items listed may not be a complete list. Talk with your dietitian about what dietary choices are right for you.  Summary  The Mediterranean diet includes both food and lifestyle choices.  Eat a variety of fresh fruits and vegetables, beans, nuts, seeds, and whole grains.  Limit the amount of red meat and sweets that you eat.  Talk with your health care provider about whether it is safe for you to drink red wine in moderation. This means 1 glass a day for nonpregnant women and 2 glasses a day for men. A glass of wine equals 5 oz (150 mL).  This information is not intended to replace advice given to you by your health care provider. Make sure you discuss any questions you have with your health care provider.  Document Revised: 08/17/2017 Document Reviewed: 08/10/2017  Elsevier Patient Education © 2020 Elsevier Inc.         Exercising to Stay Healthy  To become healthy and stay healthy, it is recommended that you do moderate-intensity and vigorous-intensity exercise. You can tell that you are exercising at a moderate intensity if your  heart starts beating faster and you start breathing faster but can still hold a conversation. You can tell that you are exercising at a vigorous intensity if you are breathing much harder and faster and cannot hold a conversation while exercising.  Exercising regularly is important. It has many health benefits, such as:  Improving overall fitness, flexibility, and endurance.  Increasing bone density.  Helping with weight control.  Decreasing body fat.  Increasing muscle strength.  Reducing stress and tension.  Improving overall health.  How often should I exercise?  Choose an activity that you enjoy, and set realistic goals. Your health care provider can help you make an activity plan that works for you.  Exercise regularly as told by your health care provider. This may include:  Doing strength training two times a week, such as:  Lifting weights.  Using resistance bands.  Push-ups.  Sit-ups.  Yoga.  Doing a certain intensity of exercise for a given amount of time. Choose from these options:  A total of 150 minutes of moderate-intensity exercise every week.  A total of 75 minutes of vigorous-intensity exercise every week.  A mix of moderate-intensity and vigorous-intensity exercise every week.  Children, pregnant women, people who have not exercised regularly, people who are overweight, and older adults may need to talk with a health care provider about what activities are safe to do. If you have a medical condition, be sure to talk with your health care provider before you start a new exercise program.  What are some exercise ideas?    Moderate-intensity exercise ideas include:  Walking 1 mile (1.6 km) in about 15 minutes.  Biking.  Hiking.  Golfing.  Dancing.  Water aerobics.  Vigorous-intensity exercise ideas include:  Walking 4.5 miles (7.2 km) or more in about 1 hour.  Jogging or running 5 miles (8 km) in about 1 hour.  Biking 10 miles (16.1 km) or more in about 1 hour.  Lap swimming.  Roller-skating or in-line  skating.  Cross-country skiing.  Vigorous competitive sports, such as football, basketball, and soccer.  Jumping rope.  Aerobic dancing.  What are some everyday activities that can help me to get exercise?  Yard work, such as:  Pushing a .  Raking and bagging leaves.  Washing your car.  Pushing a stroller.  Shoveling snow.  Gardening.  Washing windows or floors.  How can I be more active in my day-to-day activities?  Use stairs instead of an elevator.  Take a walk during your lunch break.  If you drive, park your car farther away from your work or school.  If you take public transportation, get off one stop early and walk the rest of the way.  Stand up or walk around during all of your indoor phone calls.  Get up, stretch, and walk around every 30 minutes throughout the day.  Enjoy exercise with a friend. Support to continue exercising will help you keep a regular routine of activity.  What guidelines can I follow while exercising?  Before you start a new exercise program, talk with your health care provider.  Do not exercise so much that you hurt yourself, feel dizzy, or get very short of breath.  Wear comfortable clothes and wear shoes with good support.  Drink plenty of water while you exercise to prevent dehydration or heat stroke.  Work out until your breathing and your heartbeat get faster.  Where to find more information  [place] Department of Health and Human Services: www.hhs.gov  Centers for Disease Control and Prevention (CDC): www.cdc.gov  Summary  Exercising regularly is important. It will improve your overall fitness, flexibility, and endurance.  Regular exercise also will improve your overall health. It can help you control your weight, reduce stress, and improve your bone density.  Do not exercise so much that you hurt yourself, feel dizzy, or get very short of breath.  Before you start a new exercise program, talk with your health care provider.  This information is not intended to replace  advice given to you by your health care provider. Make sure you discuss any questions you have with your health care provider.  Document Revised: 11/30/2018 Document Reviewed: 11/08/2018  Elsevier Patient Education © 2021 [employer]           Mindfulness-Based Stress Reduction  Mindfulness-based stress reduction (MBSR) is a program that helps people learn to practice mindfulness. Mindfulness is the practice of intentionally paying attention to the present moment. It can be learned and practiced through techniques such as education, breathing exercises, meditation, and yoga. MBSR includes several mindfulness techniques in one program.  MBSR works best when you understand the treatment, are willing to try new things, and can commit to spending time practicing what you learn. MBSR training may include learning about:  How your emotions, thoughts, and reactions affect your body.  New ways to respond to things that cause negative thoughts to start (triggers).  How to notice your thoughts and let go of them.  Practicing awareness of everyday things that you normally do without thinking.  The techniques and goals of different types of meditation.  What are the benefits of MBSR?  MBSR can have many benefits, which include helping you to:  Develop self-awareness. This refers to knowing and understanding yourself.  Learn skills and attitudes that help you to participate in your own health care.  Learn new ways to care for yourself.  Be more accepting about how things are, and let things go.  Be less judgmental and approach things with an open mind.  Be patient with yourself and trust yourself more.  MBSR has also been shown to:  Reduce negative emotions, such as depression and anxiety.  Improve memory and focus.  Change how you sense and approach pain.  Boost your body's ability to fight infections.  Help you connect better with other people.  Improve your sense of well-being.  Follow these instructions at home:       Find  a local in-person or online MBSR program.  Set aside some time regularly for mindfulness practice.  Find a mindfulness practice that works best for you. This may include one or more of the following:  Meditation. Meditation involves focusing your mind on a certain thought or activity.  Breathing awareness exercises. These help you to stay present by focusing on your breath.  Body scan. For this practice, you lie down and pay attention to each part of your body from head to toe. You can identify tension and soreness and intentionally relax parts of your body.  Yoga. Yoga involves stretching and breathing, and it can improve your ability to move and be flexible. It can also provide an experience of testing your body's limits, which can help you release stress.  Mindful eating. This way of eating involves focusing on the taste, texture, color, and smell of each bite of food. Because this slows down eating and helps you feel full sooner, it can be an important part of a weight-loss plan.  Find a podcast or recording that provides guidance for breathing awareness, body scan, or meditation exercises. You can listen to these any time when you have a free moment to rest without distractions.  Follow your treatment plan as told by your health care provider. This may include taking regular medicines and making changes to your diet or lifestyle as recommended.  How to practice mindfulness  To do a basic awareness exercise:  Find a comfortable place to sit.  Pay attention to the present moment. Observe your thoughts, feelings, and surroundings just as they are.  Avoid placing judgment on yourself, your feelings, or your surroundings. Make note of any judgment that comes up, and let it go.  Your mind may wander, and that is okay. Make note of when your thoughts drift, and return your attention to the present moment.  To do basic mindfulness meditation:  Find a comfortable place to sit. This may include a stable chair or a firm  floor cushion.  Sit upright with your back straight. Let your arms fall next to your side with your hands resting on your legs.  If sitting in a chair, rest your feet flat on the floor.  If sitting on a cushion, cross your legs in front of you.  Keep your head in a neutral position with your chin dropped slightly. Relax your jaw and rest the tip of your tongue on the roof of your mouth. Drop your gaze to the floor. You can close your eyes if you like.  Breathe normally and pay attention to your breath. Feel the air moving in and out of your nose. Feel your belly expanding and relaxing with each breath.  Your mind may wander, and that is okay. Make note of when your thoughts drift, and return your attention to your breath.  Avoid placing judgment on yourself, your feelings, or your surroundings. Make note of any judgment or feelings that come up, let them go, and bring your attention back to your breath.  When you are ready, lift your gaze or open your eyes. Pay attention to how your body feels after the meditation.  Where to find more information  You can find more information about MBSR from:  Your health care provider.  Community-based meditation centers or programs.  Programs offered near you.  Summary  Mindfulness-based stress reduction (MBSR) is a program that teaches you how to intentionally pay attention to the present moment. It is used with other treatments to help you cope better with daily stress, emotions, and pain.  MBSR focuses on developing self-awareness, which allows you to respond to life stress without judgment or negative emotions.  MBSR programs may involve learning different mindfulness practices, such as breathing exercises, meditation, yoga, body scan, or mindful eating. Find a mindfulness practice that works best for you, and set aside time for it on a regular basis.  This information is not intended to replace advice given to you by your health care provider. Make sure you discuss any  questions you have with your health care provider.  Document Revised: 02/22/2021 Document Reviewed: 04/26/2018  Elsevier Patient Education © 2021 Elsevier Inc.

## 2023-12-04 ENCOUNTER — OFFICE VISIT (OUTPATIENT)
Dept: INTERNAL MEDICINE | Facility: CLINIC | Age: 48
End: 2023-12-04
Payer: COMMERCIAL

## 2023-12-04 VITALS
WEIGHT: 190 LBS | SYSTOLIC BLOOD PRESSURE: 129 MMHG | TEMPERATURE: 97.1 F | HEIGHT: 65 IN | BODY MASS INDEX: 31.65 KG/M2 | OXYGEN SATURATION: 95 % | HEART RATE: 76 BPM | DIASTOLIC BLOOD PRESSURE: 88 MMHG

## 2023-12-04 DIAGNOSIS — E78.2 MIXED HYPERLIPIDEMIA: ICD-10-CM

## 2023-12-04 DIAGNOSIS — G47.09 OTHER INSOMNIA: ICD-10-CM

## 2023-12-04 DIAGNOSIS — Z00.00 ANNUAL PHYSICAL EXAM: Primary | ICD-10-CM

## 2023-12-04 DIAGNOSIS — R73.03 PREDIABETES: ICD-10-CM

## 2023-12-04 DIAGNOSIS — E66.9 OBESITY (BMI 30.0-34.9): ICD-10-CM

## 2023-12-04 DIAGNOSIS — I10 ESSENTIAL HYPERTENSION: ICD-10-CM

## 2023-12-04 DIAGNOSIS — F43.21 ADJUSTMENT DISORDER WITH DEPRESSED MOOD: ICD-10-CM

## 2023-12-04 LAB
ALBUMIN SERPL-MCNC: 4.5 G/DL (ref 3.5–5.2)
ALBUMIN/GLOB SERPL: 1.4 G/DL
ALP SERPL-CCNC: 88 U/L (ref 39–117)
ALT SERPL W P-5'-P-CCNC: 25 U/L (ref 1–41)
ANION GAP SERPL CALCULATED.3IONS-SCNC: 10.6 MMOL/L (ref 5–15)
AST SERPL-CCNC: 17 U/L (ref 1–40)
BASOPHILS # BLD AUTO: 0.05 10*3/MM3 (ref 0–0.2)
BASOPHILS NFR BLD AUTO: 0.6 % (ref 0–1.5)
BILIRUB SERPL-MCNC: 0.3 MG/DL (ref 0–1.2)
BUN SERPL-MCNC: 19 MG/DL (ref 6–20)
BUN/CREAT SERPL: 15.7 (ref 7–25)
CALCIUM SPEC-SCNC: 9.8 MG/DL (ref 8.6–10.5)
CHLORIDE SERPL-SCNC: 103 MMOL/L (ref 98–107)
CHOLEST SERPL-MCNC: 208 MG/DL (ref 0–200)
CO2 SERPL-SCNC: 25.4 MMOL/L (ref 22–29)
CREAT SERPL-MCNC: 1.21 MG/DL (ref 0.76–1.27)
DEPRECATED RDW RBC AUTO: 38.6 FL (ref 37–54)
EGFRCR SERPLBLD CKD-EPI 2021: 73.4 ML/MIN/1.73
EOSINOPHIL # BLD AUTO: 0.05 10*3/MM3 (ref 0–0.4)
EOSINOPHIL NFR BLD AUTO: 0.6 % (ref 0.3–6.2)
ERYTHROCYTE [DISTWIDTH] IN BLOOD BY AUTOMATED COUNT: 12.3 % (ref 12.3–15.4)
GLOBULIN UR ELPH-MCNC: 3.2 GM/DL
GLUCOSE SERPL-MCNC: 94 MG/DL (ref 65–99)
HBA1C MFR BLD: 6 % (ref 4.8–5.6)
HCT VFR BLD AUTO: 43.2 % (ref 37.5–51)
HDLC SERPL-MCNC: 54 MG/DL (ref 40–60)
HGB BLD-MCNC: 14.6 G/DL (ref 13–17.7)
IMM GRANULOCYTES # BLD AUTO: 0.03 10*3/MM3 (ref 0–0.05)
IMM GRANULOCYTES NFR BLD AUTO: 0.4 % (ref 0–0.5)
LDLC SERPL CALC-MCNC: 135 MG/DL (ref 0–100)
LDLC/HDLC SERPL: 2.47 {RATIO}
LYMPHOCYTES # BLD AUTO: 1.78 10*3/MM3 (ref 0.7–3.1)
LYMPHOCYTES NFR BLD AUTO: 21.1 % (ref 19.6–45.3)
MCH RBC QN AUTO: 29.3 PG (ref 26.6–33)
MCHC RBC AUTO-ENTMCNC: 33.8 G/DL (ref 31.5–35.7)
MCV RBC AUTO: 86.7 FL (ref 79–97)
MONOCYTES # BLD AUTO: 0.83 10*3/MM3 (ref 0.1–0.9)
MONOCYTES NFR BLD AUTO: 9.8 % (ref 5–12)
NEUTROPHILS NFR BLD AUTO: 5.69 10*3/MM3 (ref 1.7–7)
NEUTROPHILS NFR BLD AUTO: 67.5 % (ref 42.7–76)
NRBC BLD AUTO-RTO: 0 /100 WBC (ref 0–0.2)
PLATELET # BLD AUTO: 264 10*3/MM3 (ref 140–450)
PMV BLD AUTO: 11.2 FL (ref 6–12)
POTASSIUM SERPL-SCNC: 4.2 MMOL/L (ref 3.5–5.2)
PROT SERPL-MCNC: 7.7 G/DL (ref 6–8.5)
RBC # BLD AUTO: 4.98 10*6/MM3 (ref 4.14–5.8)
SODIUM SERPL-SCNC: 139 MMOL/L (ref 136–145)
TRIGL SERPL-MCNC: 104 MG/DL (ref 0–150)
TSH SERPL DL<=0.05 MIU/L-ACNC: 1.38 UIU/ML (ref 0.27–4.2)
VLDLC SERPL-MCNC: 19 MG/DL (ref 5–40)
WBC NRBC COR # BLD AUTO: 8.43 10*3/MM3 (ref 3.4–10.8)

## 2023-12-04 PROCEDURE — 83036 HEMOGLOBIN GLYCOSYLATED A1C: CPT | Performed by: STUDENT IN AN ORGANIZED HEALTH CARE EDUCATION/TRAINING PROGRAM

## 2023-12-04 PROCEDURE — 85025 COMPLETE CBC W/AUTO DIFF WBC: CPT | Performed by: STUDENT IN AN ORGANIZED HEALTH CARE EDUCATION/TRAINING PROGRAM

## 2023-12-04 PROCEDURE — 80053 COMPREHEN METABOLIC PANEL: CPT | Performed by: STUDENT IN AN ORGANIZED HEALTH CARE EDUCATION/TRAINING PROGRAM

## 2023-12-04 PROCEDURE — 99396 PREV VISIT EST AGE 40-64: CPT | Performed by: STUDENT IN AN ORGANIZED HEALTH CARE EDUCATION/TRAINING PROGRAM

## 2023-12-04 PROCEDURE — 80061 LIPID PANEL: CPT | Performed by: STUDENT IN AN ORGANIZED HEALTH CARE EDUCATION/TRAINING PROGRAM

## 2023-12-04 PROCEDURE — 84443 ASSAY THYROID STIM HORMONE: CPT | Performed by: STUDENT IN AN ORGANIZED HEALTH CARE EDUCATION/TRAINING PROGRAM

## 2023-12-04 NOTE — PROGRESS NOTES
"Chief Complaint  Follow-up (6 month follow up), Hypertension (6 month follow up), and Depression (6 month follow up)    Subjective          Isidoro Estrella presents to [facility]  History of Present Illness    Here with friend.    Doing well in terms of his mood.  Zoloft is helping.  Denies SI.  Does report having to urinate frequently since starting zoloft.    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  Did not find shots beneficial.      Current Outpatient Medications   Medication Instructions    amLODIPine-benazepril (Lotrel) 10-20 MG per capsule 1 capsule, Oral, Daily    cetirizine (ZYRTEC) 10 mg, Oral, Daily    Cholecalciferol 2,000 Units, Oral, Daily    Elastic Bandages & Supports (Wrist Brace Deluxe) misc 1 Units, Does not apply, Nightly    sertraline (Zoloft) 25 MG tablet Take 75 mg by mouth daily (one 50 mg tab, and one 25 mg tab)    sertraline (Zoloft) 50 MG tablet Take 75 mg by mouth daily (one 50 mg tab, and one 25 mg tab)     The following portions of the patient's history were reviewed and updated as appropriate: allergies, current medications, past family history, past medical history, past social history, past surgical history, and problem list.    Objective   Vital Signs:   /88 (BP Location: Left arm, Patient Position: Sitting)   Pulse 76   Temp 97.1 °F (36.2 °C) (Temporal)   Ht 165.1 cm (65\")   Wt 86.2 kg (190 lb)   SpO2 95%   BMI 31.62 kg/m²     BP Readings from Last 3 Encounters:   12/04/23 129/88   10/18/23 121/80   09/18/23 152/99     Wt Readings from Last 3 Encounters:   12/04/23 86.2 kg (190 lb)   10/18/23 84.3 kg (185 lb 12.8 oz)   09/18/23 82.1 kg (181 lb)         Physical Exam  Vitals reviewed.   Constitutional:       General: He is not in acute distress.     Appearance: Normal appearance. He is not ill-appearing, toxic-appearing or diaphoretic.   HENT:      Head: Normocephalic and atraumatic.      Right Ear: External ear normal. "      Left Ear: External ear normal.   Eyes:      Conjunctiva/sclera: Conjunctivae normal.   Cardiovascular:      Rate and Rhythm: Normal rate and regular rhythm.      Pulses: Normal pulses.      Heart sounds: Normal heart sounds. No murmur heard.     No friction rub. No gallop.   Pulmonary:      Effort: Pulmonary effort is normal. No respiratory distress.      Breath sounds: Normal breath sounds. No stridor. No wheezing, rhonchi or rales.   Chest:      Chest wall: No tenderness.   Abdominal:      General: Abdomen is flat.      Palpations: Abdomen is soft. There is no mass.      Tenderness: There is no abdominal tenderness.   Musculoskeletal:      Right lower leg: No edema.      Left lower leg: No edema.   Skin:     General: Skin is warm and dry.   Neurological:      General: No focal deficit present.      Mental Status: He is alert. Mental status is at baseline.   Psychiatric:         Behavior: Behavior normal.         Thought Content: Thought content normal.         Judgment: Judgment normal.      Result Review :   The following data was reviewed by: Cirilo Oneal MD on 12/04/2023:  Common labs          3/3/2023    16:28 6/2/2023    16:42   Common Labs   Glucose 123  111    BUN 17  23    Creatinine 1.00  1.08    Sodium 138  137    Potassium 3.5  3.7    Chloride 99  103    Calcium 9.6  9.3    Albumin 4.5  4.3    Total Bilirubin 0.4  0.6    Alkaline Phosphatase 84  78    AST (SGOT) 31  36    ALT (SGPT) 53  48    WBC 6.16     Hemoglobin 15.6     Hematocrit 45.8     Platelets 237     Total Cholesterol 214     Triglycerides 282     HDL Cholesterol 43     LDL Cholesterol  121     Hemoglobin A1C 5.80  6.00             Lab Results   Component Value Date    BILIRUBINUR Negative 06/27/2022       Procedures        Assessment and Plan    Diagnoses and all orders for this visit:    1. Annual physical exam (Primary)  -     CBC & Differential  -     Comprehensive Metabolic Panel  -     Hemoglobin A1c  -     Lipid Panel  -      TSH    2. Essential hypertension  -     Comprehensive Metabolic Panel    3. Adjustment disorder with depressed mood    4. Prediabetes  -     Comprehensive Metabolic Panel  -     Hemoglobin A1c    5. Other insomnia    6. Obesity (BMI 30.0-34.9)    7. Mixed hyperlipidemia  -     Lipid Panel        HTN:  -BP at goal  -labs today  -will continue lotrel    Adjustment disorder:  -improved on zoloft  -urinary frequency does not appear to be a listed side effect with zoloft  -I did suggest taking it earlier in the day, to see if this helps with his urinary symptoms    Health Maintenance:  -nutritional counseling on the components of a heart healthy diet  -exercise counseling, recommending at least 2.5 hours of moderate exercise weekly      There are no discontinued medications.       Follow Up   Return in about 6 months (around 6/4/2024) for Hypertension.  Patient was given instructions and counseling regarding his condition or for health maintenance advice. Please see specific information pulled into the AVS if appropriate.       Cirilo Oneal MD  12/04/23  17:41 EST

## 2023-12-18 DIAGNOSIS — F43.21 ADJUSTMENT DISORDER WITH DEPRESSED MOOD: ICD-10-CM

## 2023-12-27 DIAGNOSIS — F43.21 ADJUSTMENT DISORDER WITH DEPRESSED MOOD: ICD-10-CM

## 2023-12-27 NOTE — TELEPHONE ENCOUNTER
Caller: Isidoro Estrella    Relationship: Self    Best call back number: [phone]    Requested Prescriptions:   Requested Prescriptions     Pending Prescriptions Disp Refills    sertraline (Zoloft) 50 MG tablet 90 tablet 2     Sig: Take 75 mg by mouth daily (one 50 mg tab, and one 25 mg tab)        Pharmacy where request should be sent: [id number] - [facility] [place] - [address] AT [facility] [phone] [facility] [phone]      Last office visit with prescribing clinician: 12/4/2023   Last telemedicine visit with prescribing clinician: Visit date not found   Next office visit with prescribing clinician: 6/5/2024

## 2024-01-30 DIAGNOSIS — F43.21 ADJUSTMENT DISORDER WITH DEPRESSED MOOD: ICD-10-CM

## 2024-01-30 RX ORDER — SERTRALINE HYDROCHLORIDE 25 MG/1
TABLET, FILM COATED ORAL
Qty: 90 TABLET | Refills: 2 | Status: SHIPPED | OUTPATIENT
Start: 2024-01-30

## 2024-01-30 NOTE — TELEPHONE ENCOUNTER
Caller: Isidoro Estrella    Relationship: Self    Best call back number: [phone]     Requested Prescriptions:   Requested Prescriptions     Pending Prescriptions Disp Refills    sertraline (Zoloft) 25 MG tablet 90 tablet 2     Sig: Take 75 mg by mouth daily (one 50 mg tab, and one 25 mg tab)        Pharmacy where request should be sent: [id number] - [facility] [place] - [address] AT [facility] [phone] [facility] [phone]      Last office visit with prescribing clinician: 12/4/2023   Last telemedicine visit with prescribing clinician: Visit date not found   Next office visit with prescribing clinician: 6/5/2024     Additional details provided by patient: PATIENT ONLY HAS A COUPLE OF DAYS LEFT. PATIENT JUST RECENTLY HAD 50MG DOSAGE REFILLED.     Does the patient have less than a 3 day supply:  [x] Yes  [] No    Would you like a call back once the refill request has been completed: [] Yes [] No    If the office needs to give you a call back, can they leave a voicemail: [] Yes [] No    Sandra Alvarado Rep   01/30/24 14:41 EST

## 2024-03-06 ENCOUNTER — TELEPHONE (OUTPATIENT)
Dept: INTERNAL MEDICINE | Facility: CLINIC | Age: 49
End: 2024-03-06
Payer: COMMERCIAL

## 2024-03-06 NOTE — TELEPHONE ENCOUNTER
PATIENT CAME INTO THE OFFICE NEEDING HIS WORK ANNUAL WELLNESS VISIT CERTIFICATION FORM COMPLETED.    PATIENT HAD HIS ANNUAL PHYSICAL IN DECEMBER.    PAPERWORK PUT IN MA'S MAIL TRAY UPFRONT.

## 2024-05-13 ENCOUNTER — OFFICE VISIT (OUTPATIENT)
Dept: INTERNAL MEDICINE | Facility: CLINIC | Age: 49
End: 2024-05-13
Payer: COMMERCIAL

## 2024-05-13 VITALS
DIASTOLIC BLOOD PRESSURE: 92 MMHG | HEIGHT: 65 IN | SYSTOLIC BLOOD PRESSURE: 138 MMHG | OXYGEN SATURATION: 97 % | WEIGHT: 200.8 LBS | TEMPERATURE: 99.3 F | BODY MASS INDEX: 33.45 KG/M2 | HEART RATE: 107 BPM

## 2024-05-13 DIAGNOSIS — I10 ESSENTIAL HYPERTENSION: ICD-10-CM

## 2024-05-13 DIAGNOSIS — H74.8X1 HEMATOTYMPANUM OF RIGHT EAR: Primary | ICD-10-CM

## 2024-05-13 PROCEDURE — 99214 OFFICE O/P EST MOD 30 MIN: CPT | Performed by: STUDENT IN AN ORGANIZED HEALTH CARE EDUCATION/TRAINING PROGRAM

## 2024-05-13 NOTE — PROGRESS NOTES
Chief Complaint  Hypertension and Ear Drainage (Follow up on Right ear )    Subjective          Isidoro Estrella presents to [facility]  History of Present Illness    Seen by me about one month ago.  Mr. Estrella was accompanying his child for child's visit when he reported pain in his right ear.  I noted blood under the TM at that time, and we scheduled follow up to check for resolution of this.    He reports having had the flu at the time.    He reports doing well at present, and denies ear pain, drainage, or issues with hearing.    Elevated BP reading noted today.  Mr. Estrella reports that he forgot to take his BP medicine today.    PHQ-9 Depression Screening  Little interest or pleasure in doing things? 1-->several days   Feeling down, depressed, or hopeless? 0-->not at all   Trouble falling or staying asleep, or sleeping too much?     Feeling tired or having little energy?     Poor appetite or overeating?     Feeling bad about yourself - or that you are a failure or have let yourself or your family down?     Trouble concentrating on things, such as reading the newspaper or watching television?     Moving or speaking so slowly that other people could have noticed? Or the opposite - being so fidgety or restless that you have been moving around a lot more than usual?     Thoughts that you would be better off dead, or of hurting yourself in some way?     PHQ-9 Total Score 1   If you checked off any problems, how difficult have these problems made it for you to do your work, take care of things at home, or get along with other people?           Current Outpatient Medications   Medication Instructions    amLODIPine-benazepril (Lotrel) 10-20 MG per capsule 1 capsule, Oral, Daily    cetirizine (ZYRTEC) 10 mg, Oral, Daily    Cholecalciferol 2,000 Units, Oral, Daily    Elastic Bandages & Supports (Wrist Brace Deluxe) misc 1 Units, Does not apply, Nightly    sertraline (Zoloft)  "25 MG tablet Take 75 mg by mouth daily (one 50 mg tab, and one 25 mg tab)    sertraline (Zoloft) 50 MG tablet Take 75 mg by mouth daily (one 50 mg tab, and one 25 mg tab)       The following portions of the patient's history were reviewed and updated as appropriate: allergies, current medications, past family history, past medical history, past social history, past surgical history, and problem list.    Objective   Vital Signs:   /92 (BP Location: Right arm, Patient Position: Sitting, Cuff Size: Adult)   Pulse 107   Temp 99.3 °F (37.4 °C) (Temporal)   Ht 165.1 cm (65\")   Wt 91.1 kg (200 lb 12.8 oz)   SpO2 97%   BMI 33.41 kg/m²     BP Readings from Last 3 Encounters:   05/13/24 138/92   12/04/23 129/88   10/18/23 121/80     Wt Readings from Last 3 Encounters:   05/13/24 91.1 kg (200 lb 12.8 oz)   12/04/23 86.2 kg (190 lb)   10/18/23 84.3 kg (185 lb 12.8 oz)           Physical Exam  Vitals reviewed.   Constitutional:       General: He is not in acute distress.     Appearance: Normal appearance. He is not ill-appearing, toxic-appearing or diaphoretic.   HENT:      Head: Normocephalic and atraumatic.      Right Ear: Tympanic membrane, ear canal and external ear normal.      Left Ear: Tympanic membrane, ear canal and external ear normal.   Eyes:      Conjunctiva/sclera: Conjunctivae normal.   Cardiovascular:      Rate and Rhythm: Normal rate and regular rhythm.      Pulses: Normal pulses.      Heart sounds: Normal heart sounds. No murmur heard.     No friction rub. No gallop.   Pulmonary:      Effort: Pulmonary effort is normal. No respiratory distress.      Breath sounds: Normal breath sounds. No stridor. No wheezing, rhonchi or rales.   Chest:      Chest wall: No tenderness.   Abdominal:      General: Abdomen is flat.      Palpations: Abdomen is soft. There is no mass.      Tenderness: There is no abdominal tenderness.   Musculoskeletal:      Right lower leg: No edema.      Left lower leg: No edema. "   Skin:     General: Skin is warm and dry.   Neurological:      General: No focal deficit present.      Mental Status: He is alert. Mental status is at baseline.   Psychiatric:         Behavior: Behavior normal.         Thought Content: Thought content normal.         Judgment: Judgment normal.          Result Review :   The following data was reviewed by: Cirilo Oneal MD on 05/13/2024:  Common labs          6/2/2023    16:42 12/4/2023    16:36   Common Labs   Glucose 111  94    BUN 23  19    Creatinine 1.08  1.21    Sodium 137  139    Potassium 3.7  4.2    Chloride 103  103    Calcium 9.3  9.8    Albumin 4.3  4.5    Total Bilirubin 0.6  0.3    Alkaline Phosphatase 78  88    AST (SGOT) 36  17    ALT (SGPT) 48  25    WBC  8.43    Hemoglobin  14.6    Hematocrit  43.2    Platelets  264    Total Cholesterol  208    Triglycerides  104    HDL Cholesterol  54    LDL Cholesterol   135    Hemoglobin A1C 6.00  6.00             Lab Results   Component Value Date    BILIRUBINUR Negative 06/27/2022       Procedures        Assessment and Plan    Diagnoses and all orders for this visit:    1. Hematotympanum of right ear (Primary)    2. Essential hypertension      Hemotympanum, right ear:  -resolved    HTN:  -BP above goal of < 130/80, but forgot to take his medicines  -I counseled Mr. Estrella today on the importance of medication adherence    There are no discontinued medications.       Follow Up   Will RTC on 6/5/24 for routine follow up  Patient was given instructions and counseling regarding his condition or for health maintenance advice. Please see specific information pulled into the AVS if appropriate.       Cirilo Oneal MD  05/13/24  13:44 EDT

## 2024-07-08 DIAGNOSIS — I10 ESSENTIAL HYPERTENSION: ICD-10-CM

## 2024-07-08 RX ORDER — AMLODIPINE BESYLATE AND BENAZEPRIL HYDROCHLORIDE 10; 20 MG/1; MG/1
1 CAPSULE ORAL DAILY
Qty: 90 CAPSULE | Refills: 2 | Status: SHIPPED | OUTPATIENT
Start: 2024-07-08

## 2024-12-22 ENCOUNTER — APPOINTMENT (OUTPATIENT)
Dept: CT IMAGING | Facility: HOSPITAL | Age: 49
End: 2024-12-22
Payer: COMMERCIAL

## 2024-12-22 ENCOUNTER — HOSPITAL ENCOUNTER (EMERGENCY)
Facility: HOSPITAL | Age: 49
Discharge: HOME | End: 2024-12-23
Attending: EMERGENCY MEDICINE | Admitting: EMERGENCY MEDICINE
Payer: COMMERCIAL

## 2024-12-22 DIAGNOSIS — N20.1 RIGHT URETERAL STONE: ICD-10-CM

## 2024-12-22 DIAGNOSIS — R10.9 ACUTE RIGHT FLANK PAIN: Primary | ICD-10-CM

## 2024-12-22 DIAGNOSIS — N23 RENAL COLIC ON RIGHT SIDE: ICD-10-CM

## 2024-12-22 LAB
BASOPHILS # BLD AUTO: 0.08 10*3/MM3 (ref 0–0.2)
BASOPHILS NFR BLD AUTO: 0.6 % (ref 0–1.5)
D-LACTATE SERPL-SCNC: 1.5 MMOL/L (ref 0.5–2)
DEPRECATED RDW RBC AUTO: 41.1 FL (ref 37–54)
EOSINOPHIL # BLD AUTO: 0.11 10*3/MM3 (ref 0–0.4)
EOSINOPHIL NFR BLD AUTO: 0.8 % (ref 0.3–6.2)
ERYTHROCYTE [DISTWIDTH] IN BLOOD BY AUTOMATED COUNT: 12.8 % (ref 12.3–15.4)
HCT VFR BLD AUTO: 45.7 % (ref 37.5–51)
HGB BLD-MCNC: 14.5 G/DL (ref 13–17.7)
HOLD SPECIMEN: NORMAL
HOLD SPECIMEN: NORMAL
IMM GRANULOCYTES # BLD AUTO: 0.05 10*3/MM3 (ref 0–0.05)
IMM GRANULOCYTES NFR BLD AUTO: 0.4 % (ref 0–0.5)
LYMPHOCYTES # BLD AUTO: 1.69 10*3/MM3 (ref 0.7–3.1)
LYMPHOCYTES NFR BLD AUTO: 12.6 % (ref 19.6–45.3)
MCH RBC QN AUTO: 27.9 PG (ref 26.6–33)
MCHC RBC AUTO-ENTMCNC: 31.7 G/DL (ref 31.5–35.7)
MCV RBC AUTO: 87.9 FL (ref 79–97)
MONOCYTES # BLD AUTO: 1.3 10*3/MM3 (ref 0.1–0.9)
MONOCYTES NFR BLD AUTO: 9.7 % (ref 5–12)
NEUTROPHILS NFR BLD AUTO: 10.14 10*3/MM3 (ref 1.7–7)
NEUTROPHILS NFR BLD AUTO: 75.9 % (ref 42.7–76)
NRBC BLD AUTO-RTO: 0 /100 WBC (ref 0–0.2)
PLATELET # BLD AUTO: 257 10*3/MM3 (ref 140–450)
PMV BLD AUTO: 10.4 FL (ref 6–12)
RBC # BLD AUTO: 5.2 10*6/MM3 (ref 4.14–5.8)
WBC NRBC COR # BLD AUTO: 13.37 10*3/MM3 (ref 3.4–10.8)
WHOLE BLOOD HOLD COAG: NORMAL
WHOLE BLOOD HOLD SPECIMEN: NORMAL

## 2024-12-22 PROCEDURE — 83605 ASSAY OF LACTIC ACID: CPT | Performed by: EMERGENCY MEDICINE

## 2024-12-22 PROCEDURE — 96374 THER/PROPH/DIAG INJ IV PUSH: CPT

## 2024-12-22 PROCEDURE — 25010000002 KETOROLAC TROMETHAMINE PER 15 MG: Performed by: EMERGENCY MEDICINE

## 2024-12-22 PROCEDURE — 25810000003 SODIUM CHLORIDE 0.9 % SOLUTION: Performed by: EMERGENCY MEDICINE

## 2024-12-22 PROCEDURE — 87040 BLOOD CULTURE FOR BACTERIA: CPT | Performed by: EMERGENCY MEDICINE

## 2024-12-22 PROCEDURE — 83690 ASSAY OF LIPASE: CPT | Performed by: EMERGENCY MEDICINE

## 2024-12-22 PROCEDURE — 36415 COLL VENOUS BLD VENIPUNCTURE: CPT

## 2024-12-22 PROCEDURE — 74176 CT ABD & PELVIS W/O CONTRAST: CPT

## 2024-12-22 PROCEDURE — 25010000002 HYDROMORPHONE 1 MG/ML SOLUTION: Performed by: EMERGENCY MEDICINE

## 2024-12-22 PROCEDURE — 80053 COMPREHEN METABOLIC PANEL: CPT | Performed by: EMERGENCY MEDICINE

## 2024-12-22 PROCEDURE — 96375 TX/PRO/DX INJ NEW DRUG ADDON: CPT

## 2024-12-22 PROCEDURE — 85025 COMPLETE CBC W/AUTO DIFF WBC: CPT | Performed by: EMERGENCY MEDICINE

## 2024-12-22 PROCEDURE — 25010000002 ONDANSETRON PER 1 MG: Performed by: EMERGENCY MEDICINE

## 2024-12-22 PROCEDURE — 99284 EMERGENCY DEPT VISIT MOD MDM: CPT

## 2024-12-22 RX ORDER — KETOROLAC TROMETHAMINE 30 MG/ML
30 INJECTION, SOLUTION INTRAMUSCULAR; INTRAVENOUS ONCE
Status: COMPLETED | OUTPATIENT
Start: 2024-12-22 | End: 2024-12-22

## 2024-12-22 RX ORDER — SODIUM CHLORIDE 0.9 % (FLUSH) 0.9 %
10 SYRINGE (ML) INJECTION AS NEEDED
Status: DISCONTINUED | OUTPATIENT
Start: 2024-12-22 | End: 2024-12-23 | Stop reason: HOSPADM

## 2024-12-22 RX ORDER — ONDANSETRON 2 MG/ML
4 INJECTION INTRAMUSCULAR; INTRAVENOUS ONCE
Status: COMPLETED | OUTPATIENT
Start: 2024-12-22 | End: 2024-12-22

## 2024-12-22 RX ADMIN — KETOROLAC TROMETHAMINE 30 MG: 30 INJECTION, SOLUTION INTRAMUSCULAR; INTRAVENOUS at 23:40

## 2024-12-22 RX ADMIN — ONDANSETRON 4 MG: 2 INJECTION INTRAMUSCULAR; INTRAVENOUS at 23:40

## 2024-12-22 RX ADMIN — SODIUM CHLORIDE 1000 ML: 9 INJECTION, SOLUTION INTRAVENOUS at 23:38

## 2024-12-22 RX ADMIN — HYDROMORPHONE HYDROCHLORIDE 1 MG: 1 INJECTION, SOLUTION INTRAMUSCULAR; INTRAVENOUS; SUBCUTANEOUS at 23:40

## 2024-12-23 ENCOUNTER — TELEPHONE (OUTPATIENT)
Dept: SURGERY | Facility: CLINIC | Age: 49
End: 2024-12-23
Payer: COMMERCIAL

## 2024-12-23 VITALS
OXYGEN SATURATION: 95 % | TEMPERATURE: 98.8 F | HEART RATE: 99 BPM | RESPIRATION RATE: 26 BRPM | WEIGHT: 207.01 LBS | BODY MASS INDEX: 34.49 KG/M2 | DIASTOLIC BLOOD PRESSURE: 85 MMHG | HEIGHT: 65 IN | SYSTOLIC BLOOD PRESSURE: 132 MMHG

## 2024-12-23 LAB
ALBUMIN SERPL-MCNC: 4.1 G/DL (ref 3.5–5.2)
ALBUMIN/GLOB SERPL: 1.1 G/DL
ALP SERPL-CCNC: 85 U/L (ref 39–117)
ALT SERPL W P-5'-P-CCNC: 43 U/L (ref 1–41)
ANION GAP SERPL CALCULATED.3IONS-SCNC: 9.4 MMOL/L (ref 5–15)
AST SERPL-CCNC: 25 U/L (ref 1–40)
BACTERIA UR QL AUTO: ABNORMAL /HPF
BILIRUB SERPL-MCNC: 0.3 MG/DL (ref 0–1.2)
BILIRUB UR QL STRIP: NEGATIVE
BUN SERPL-MCNC: 15 MG/DL (ref 6–20)
BUN/CREAT SERPL: 10.8 (ref 7–25)
CALCIUM SPEC-SCNC: 9.6 MG/DL (ref 8.6–10.5)
CHLORIDE SERPL-SCNC: 104 MMOL/L (ref 98–107)
CLARITY UR: ABNORMAL
CO2 SERPL-SCNC: 27.6 MMOL/L (ref 22–29)
COLOR UR: YELLOW
CREAT SERPL-MCNC: 1.39 MG/DL (ref 0.76–1.27)
EGFRCR SERPLBLD CKD-EPI 2021: 61.8 ML/MIN/1.73
GLOBULIN UR ELPH-MCNC: 3.6 GM/DL
GLUCOSE SERPL-MCNC: 118 MG/DL (ref 65–99)
GLUCOSE UR STRIP-MCNC: NEGATIVE MG/DL
HGB UR QL STRIP.AUTO: ABNORMAL
HYALINE CASTS UR QL AUTO: ABNORMAL /LPF
KETONES UR QL STRIP: NEGATIVE
LEUKOCYTE ESTERASE UR QL STRIP.AUTO: ABNORMAL
LIPASE SERPL-CCNC: 36 U/L (ref 13–60)
NITRITE UR QL STRIP: NEGATIVE
PH UR STRIP.AUTO: 6 [PH] (ref 5–8)
POTASSIUM SERPL-SCNC: 4.1 MMOL/L (ref 3.5–5.2)
PROT SERPL-MCNC: 7.7 G/DL (ref 6–8.5)
PROT UR QL STRIP: ABNORMAL
RBC # UR STRIP: ABNORMAL /HPF
REF LAB TEST METHOD: ABNORMAL
SODIUM SERPL-SCNC: 141 MMOL/L (ref 136–145)
SP GR UR STRIP: 1.02 (ref 1–1.03)
SQUAMOUS #/AREA URNS HPF: ABNORMAL /HPF
UROBILINOGEN UR QL STRIP: ABNORMAL
WBC # UR STRIP: ABNORMAL /HPF

## 2024-12-23 PROCEDURE — 25010000002 MORPHINE PER 10 MG: Performed by: EMERGENCY MEDICINE

## 2024-12-23 PROCEDURE — 87086 URINE CULTURE/COLONY COUNT: CPT | Performed by: EMERGENCY MEDICINE

## 2024-12-23 PROCEDURE — 96375 TX/PRO/DX INJ NEW DRUG ADDON: CPT

## 2024-12-23 PROCEDURE — 96376 TX/PRO/DX INJ SAME DRUG ADON: CPT

## 2024-12-23 PROCEDURE — 81001 URINALYSIS AUTO W/SCOPE: CPT | Performed by: EMERGENCY MEDICINE

## 2024-12-23 PROCEDURE — 25010000002 ONDANSETRON PER 1 MG: Performed by: EMERGENCY MEDICINE

## 2024-12-23 RX ORDER — OXYCODONE AND ACETAMINOPHEN 7.5; 325 MG/1; MG/1
1 TABLET ORAL EVERY 6 HOURS PRN
Qty: 12 TABLET | Refills: 0 | Status: SHIPPED | OUTPATIENT
Start: 2024-12-23

## 2024-12-23 RX ORDER — ONDANSETRON 2 MG/ML
4 INJECTION INTRAMUSCULAR; INTRAVENOUS ONCE
Status: COMPLETED | OUTPATIENT
Start: 2024-12-23 | End: 2024-12-23

## 2024-12-23 RX ORDER — TAMSULOSIN HYDROCHLORIDE 0.4 MG/1
1 CAPSULE ORAL DAILY
Qty: 7 CAPSULE | Refills: 0 | Status: SHIPPED | OUTPATIENT
Start: 2024-12-23

## 2024-12-23 RX ORDER — ONDANSETRON 4 MG/1
4 TABLET, ORALLY DISINTEGRATING ORAL EVERY 6 HOURS PRN
Qty: 10 TABLET | Refills: 0 | Status: SHIPPED | OUTPATIENT
Start: 2024-12-23

## 2024-12-23 RX ADMIN — MORPHINE SULFATE 4 MG: 4 INJECTION, SOLUTION INTRAMUSCULAR; INTRAVENOUS at 01:14

## 2024-12-23 RX ADMIN — ONDANSETRON 4 MG: 2 INJECTION INTRAMUSCULAR; INTRAVENOUS at 01:14

## 2024-12-23 NOTE — DISCHARGE INSTRUCTIONS
Your pain is due to passing a 6 mm kidney stone down the right ureter.    Take the pain medicine and nausea medicine as needed and drink plenty of fluids and you can use the Flomax pill once a day to help the stone pass.    Please call the urology clinic first thing in the morning for a follow-up appointment if you do not pass the stone.

## 2024-12-23 NOTE — ED PROVIDER NOTES
Time: 11:01 PM EST  Date of encounter:  12/22/2024  Independent Historian/Clinical History and Information was obtained by:   Patient and Nursing Staff    History is limited by: N/A    Chief Complaint: Severe right flank pain      History of Present Illness:  Patient is a 49 y.o. year old male with history of kidney stones who presents to the emergency department for evaluation of acute onset of severe right-sided flank pain radiating to the right lower quadrant associated with nausea and some blood in his urine.  Denies any fever or dysuria.    Feels like previous kidney stone.  He is already status post appendectomy.        Patient Care Team  Primary Care Provider: Cirilo Oneal MD    Past Medical History:     Allergies   Allergen Reactions    Latex Itching and Swelling    Penicillins Rash     Past Medical History:   Diagnosis Date    Allergic     Colon polyps     Gallstones     Hypertension     Kidney stone      Past Surgical History:   Procedure Laterality Date    APPENDECTOMY      COLONOSCOPY N/A 9/6/2022    Procedure: COLONOSCOPY WITH POLYPECTOMIES, HOT SNARE;  Surgeon: Sharif Lopez MD;  Location: [facility] ENDOSCOPY;  Service: General;  Laterality: N/A;  COLON POLYPS    COLONOSCOPY N/A 7/21/2023    Procedure: COLONOSCOPY;  Surgeon: Sharif Lopez MD;  Location: [facility] ENDOSCOPY;  Service: General;  Laterality: N/A;  NORMAL    KIDNEY STONE SURGERY       Family History   Problem Relation Age of Onset    Diabetes Father        Home Medications:  Prior to Admission medications    Medication Sig Start Date End Date Taking? Authorizing Provider   amLODIPine-benazepril (LOTREL) 10-20 MG per capsule TAKE 1 CAPSULE BY MOUTH DAILY 7/8/24   Cirilo Oneal MD   cetirizine (zyrTEC) 10 MG tablet Take 1 tablet by mouth Daily. 9/28/22   Cirilo Oneal MD   Cholecalciferol 50 MCG (2000 UT) capsule Take 1 capsule by mouth Daily. 9/29/22   Cirilo Oneal MD   Elastic Bandages & Supports (Wrist Brace  "Deluxe) misc 1 Units Every Night.  Patient not taking: Reported on 5/13/2024 6/27/22   Cirilo Oneal MD   sertraline (Zoloft) 25 MG tablet Take 75 mg by mouth daily (one 50 mg tab, and one 25 mg tab) 1/30/24   Cirilo Oneal MD   sertraline (Zoloft) 50 MG tablet Take 75 mg by mouth daily (one 50 mg tab, and one 25 mg tab) 12/27/23   Cirilo Oneal MD        Social History:   Social History     Tobacco Use    Smoking status: Never    Smokeless tobacco: Never   Vaping Use    Vaping status: Never Used   Substance Use Topics    Alcohol use: Never    Drug use: Never         Review of Systems:  Review of Systems   I performed a 10 point review of systems which was all negative, except for the positives found in the HPI above.      Physical Exam:  BP (!) 164/104 (BP Location: Left arm, Patient Position: Sitting)   Pulse 99   Temp 98.8 °F (37.1 °C) (Oral)   Resp 26   Ht 165.1 cm (65\")   Wt 93.9 kg (207 lb 0.2 oz)   SpO2 94%   BMI 34.45 kg/m²     Physical Exam   General: Awake alert and in severe distress due to flank pain    HEENT: Head normocephalic atraumatic, eyes PERRLA EOMI, nose normal, oropharynx normal.    Neck: Supple full range of motion, no meningismus, no lymphadenopathy    Heart: Regular rate and rhythm, no murmurs or rubs, 2+ radial pulses bilaterally    Lungs: Clear to auscultation bilaterally without wheezes or crackles, no respiratory distress    Back exam: Right CVA tenderness, no rash    Abdomen: Soft, nontender, nondistended, no rebound or guarding    Skin: Warm, dry, no rash    Musculoskeletal: Normal range of motion, no lower extremity edema    Neurologic: Oriented x3, no motor deficits no sensory deficits    Psychiatric: Mood appears stable, no psychosis            Medical Decision Making:      Comorbidities that affect care:    Kidney stones    External Notes reviewed:    None      The following orders were placed and all results were independently analyzed by me:  Orders Placed This " Encounter   Procedures    Blood Culture - Blood,    Blood Culture - Blood,    CT Abdomen Pelvis Without Contrast    [place] Draw    Comprehensive Metabolic Panel    Lipase    Urinalysis With Microscopic If Indicated (No Culture) - Urine, Clean Catch    Lactic Acid, Plasma    CBC Auto Differential    Urinalysis, Microscopic Only - Urine, Clean Catch    Ambulatory Referral to Urology    NPO Diet NPO Type: Strict NPO    Undress & Gown    Insert Peripheral IV    CBC & Differential    Green Top (Gel)    Lavender Top    Gold Top - SST    Light Blue Top       Medications Given in the Emergency Department:  Medications   sodium chloride 0.9 % flush 10 mL (has no administration in time range)   HYDROmorphone (DILAUDID) injection 1 mg (1 mg Intravenous Given 12/22/24 2340)   ketorolac (TORADOL) injection 30 mg (30 mg Intravenous Given 12/22/24 2340)   ondansetron (ZOFRAN) injection 4 mg (4 mg Intravenous Given 12/22/24 2340)   sodium chloride 0.9 % bolus 1,000 mL (1,000 mL Intravenous New Bag 12/22/24 2338)   morphine injection 4 mg (4 mg Intravenous Given 12/23/24 0114)   ondansetron (ZOFRAN) injection 4 mg (4 mg Intravenous Given 12/23/24 0114)        ED Course:         Labs:    Lab Results (last 24 hours)       Procedure Component Value Units Date/Time    Blood Culture - Blood, Arm, Right [[id number]] Collected: 12/22/24 2325    Specimen: Blood from Arm, Right Updated: 12/22/24 2339    CBC & Differential [[id number]]  (Abnormal) Collected: 12/22/24 2331    Specimen: Blood Updated: 12/22/24 2343    Narrative:      The following orders were created for panel order CBC & Differential.  Procedure                               Abnormality         Status                     ---------                               -----------         ------                     CBC Auto Differential[[id number]]        Abnormal            Final result                 Please view results for these tests on the individual orders.    Comprehensive  Metabolic Panel [[id number]]  (Abnormal) Collected: 12/22/24 2331    Specimen: Blood Updated: 12/23/24 0001     Glucose 118 mg/dL      BUN 15 mg/dL      Creatinine 1.39 mg/dL      Sodium 141 mmol/L      Potassium 4.1 mmol/L      Chloride 104 mmol/L      CO2 27.6 mmol/L      Calcium 9.6 mg/dL      Total Protein 7.7 g/dL      Albumin 4.1 g/dL      ALT (SGPT) 43 U/L      AST (SGOT) 25 U/L      Alkaline Phosphatase 85 U/L      Total Bilirubin 0.3 mg/dL      Globulin 3.6 gm/dL      A/G Ratio 1.1 g/dL      BUN/Creatinine Ratio 10.8     Anion Gap 9.4 mmol/L      eGFR 61.8 mL/min/1.73     Narrative:      GFR Categories in Chronic Kidney Disease (CKD)      GFR Category          GFR (mL/min/1.73)    Interpretation  G1                     90 or greater         Normal or high (1)  G2                      60-89                Mild decrease (1)  G3a                   45-59                Mild to moderate decrease  G3b                   30-44                Moderate to severe decrease  G4                    15-29                Severe decrease  G5                    14 or less           Kidney failure          (1)In the absence of evidence of kidney disease, neither GFR category G1 or G2 fulfill the criteria for CKD.    eGFR calculation 2021 CKD-EPI creatinine equation, which does not include race as a factor    Lipase [[id number]]  (Normal) Collected: 12/22/24 2331    Specimen: Blood Updated: 12/23/24 0001     Lipase 36 U/L     Lactic Acid, Plasma [[id number]]  (Normal) Collected: 12/22/24 2331    Specimen: Blood Updated: 12/22/24 2358     Lactate 1.5 mmol/L     Blood Culture - Blood, Arm, Right [[id number]] Collected: 12/22/24 2331    Specimen: Blood from Arm, Right Updated: 12/22/24 2339    CBC Auto Differential [[id number]]  (Abnormal) Collected: 12/22/24 2331    Specimen: Blood Updated: 12/22/24 2343     WBC 13.37 10*3/mm3      RBC 5.20 10*6/mm3      Hemoglobin 14.5 g/dL      Hematocrit 45.7 %      MCV 87.9 fL      MCH 27.9 pg       MCHC 31.7 g/dL      RDW 12.8 %      RDW-SD 41.1 fl      MPV 10.4 fL      Platelets 257 10*3/mm3      Neutrophil % 75.9 %      Lymphocyte % 12.6 %      Monocyte % 9.7 %      Eosinophil % 0.8 %      Basophil % 0.6 %      Immature Grans % 0.4 %      Neutrophils, Absolute 10.14 10*3/mm3      Lymphocytes, Absolute 1.69 10*3/mm3      Monocytes, Absolute 1.30 10*3/mm3      Eosinophils, Absolute 0.11 10*3/mm3      Basophils, Absolute 0.08 10*3/mm3      Immature Grans, Absolute 0.05 10*3/mm3      nRBC 0.0 /100 WBC     Urinalysis With Microscopic If Indicated (No Culture) - Urine, Clean Catch [[id number]]  (Abnormal) Collected: 12/23/24 0117    Specimen: Urine, Clean Catch Updated: 12/23/24 0127     Color, UA Yellow     Appearance, UA Cloudy     pH, UA 6.0     Specific Gravity, UA 1.019     Glucose, UA Negative     Ketones, UA Negative     Bilirubin, UA Negative     Blood, UA Large (3+)     Protein,  mg/dL (2+)     Leuk Esterase, UA Small (1+)     Nitrite, UA Negative     Urobilinogen, UA 0.2 E.U./dL    Urinalysis, Microscopic Only - Urine, Clean Catch [[id number]]  (Abnormal) Collected: 12/23/24 0117    Specimen: Urine, Clean Catch Updated: 12/23/24 0129     RBC, UA Too Numerous to Count /HPF      WBC, UA 21-50 /HPF      Bacteria, UA None Seen /HPF      Squamous Epithelial Cells, UA 0-2 /HPF      Hyaline Casts, UA 3-6 /LPF      Methodology Automated Microscopy             Imaging:    CT Abdomen Pelvis Without Contrast    Result Date: 12/23/2024  CT ABDOMEN PELVIS WO CONTRAST Date of Exam: 12/22/2024 11:59 PM EST Indication: Right flank pain and right lower quadrant pain. Comparison: 7/16/2021 Technique: Axial CT images were obtained of the abdomen and pelvis without the administration of contrast. Reconstructed coronal and sagittal images were also obtained. Automated exposure control and iterative construction methods were used. Findings: Lung bases are clear. There is bilateral gynecomastia. Abdominal aorta is normal  in caliber. Gallbladder unremarkable. No biliary obstruction. There is generalized hepatic steatosis. There are bilateral nonobstructing kidney stones, tiny on the right and  much larger on the left. There is a large calcification in the left renal pelvis measuring 2.3 x 1.0 x 1.4 cm. This is new since the previous study and may be causing some minimal dilatation of the left renal pelvis. There is mild right hydronephrosis secondary to a 6 mm stone in the proximal ureter. No additional ureteral stones on either side. The remaining solid abdominal organs are within normal limits. There is a small fat-containing umbilical hernia. Urinary bladder is normal. Dystrophic calcifications are noted in the prostate gland. The appendix is surgically absent. No evidence of acute colitis. No small bowel obstruction is identified. Stomach is normal. No adenopathy or free fluid is seen.     1.Mild right hydronephrosis secondary to a 6 mm stone in the proximal right ureter. 2.Bilateral nonobstructing kidney stones. There is a large calcification in the left renal pelvis which may be causing some minimal dilatation of the left renal pelvis. This is new since the previous study. 3.Hepatic steatosis. 4.Small fat-containing umbilical hernia. Electronically Signed: Marc Vasquez MD  12/23/2024 12:20 AM EST  Workstation ID: [id number]       Differential Diagnosis and Discussion:    Flank Pain: Differential diagnosis includes but is not limited to kidney stones, pyelonephritis, musculoskeletal disorders, renal infarction, urinary tract infection, hydronephrosis, radiculopathy, aortic aneurysm, renal cell carcinoma.    PROCEDURES:    Labs were collected in the emergency department and all labs were reviewed and interpreted by me.  CT scan was performed in the emergency department and the CT scan radiology impression was interpreted by me.    No orders to display       Procedures    MDM             This patient is a 49-year-old male with  previous history of kidney stones presents with nontraumatic severe right flank pain radiating to the right lower quadrant associated with blood in his urine and nausea.    Clinically, it looks like renal colic from obstructing stone and I will get CT imaging of the abdomen pelvis to evaluate this further.    I am also checking blood work and urinalysis to rule out signs of infection.    I am giving him some IV pain and nausea medicine and IV fluids and we will reassess.    Urinalysis shows blood but only small leukocyte esterase and I have low suspicion for infection, nitrite negative.    CT imaging shows obstructing 6 mm mid ureteral stone on the right that is causing his pain.    After couple doses of pain medicine he looked improved and I think he be safely discharged home with strict return precautions for any fevers or chills or worsening symptoms.    I will place an urgent ambulatory referral for urology for his renal colic.  I will call in some pain and nausea meds and Flomax.                    Patient Care Considerations:          Consultants/Shared Management Plan:        Social Determinants of Health:    Patient is independent, reliable, and has access to care.       Disposition and Care Coordination:    Discharged: The patient is suitable and stable for discharge with no need for consideration of admission.    I have explained the patient´s condition, diagnoses and treatment plan based on the information available to me at this time. I have answered questions and addressed any concerns. The patient has a good  understanding of the patient´s diagnosis, condition, and treatment plan as can be expected at this point. The vital signs have been stable. The patient´s condition is stable and appropriate for discharge from the emergency department.      The patient will pursue further outpatient evaluation with the primary care physician or other designated or consulting physician as outlined in the discharge  instructions. They are agreeable to this plan of care and follow-up instructions have been explained in detail. The patient has received these instructions in written format and has expressed an understanding of the discharge instructions. The patient is aware that any significant change in condition or worsening of symptoms should prompt an immediate return to this or the closest emergency department or call to 911.  I have explained discharge medications and the need for follow up with the patient/caretakers. This was also printed in the discharge instructions. Patient was discharged with the following medications and follow up:      Medication List        New Prescriptions      ondansetron ODT 4 MG disintegrating tablet  Commonly known as: ZOFRAN-ODT  Place 1 tablet on the tongue Every 6 (Six) Hours As Needed for Nausea or Vomiting.     oxyCODONE-acetaminophen 7.5-325 MG per tablet  Commonly known as: PERCOCET  Take 1 tablet by mouth Every 6 (Six) Hours As Needed for Severe Pain or Moderate Pain.     tamsulosin 0.4 MG capsule 24 hr capsule  Commonly known as: FLOMAX  Take 1 capsule by mouth Daily.               Where to Get Your Medications        These medications were sent to [id number] - [place], [place] - [address] - [phone]  - [phone]   [address] [place] [address]      Hours: 24-hours Phone: [phone]   ondansetron ODT 4 MG disintegrating tablet  oxyCODONE-acetaminophen 7.5-325 MG per tablet  tamsulosin 0.4 MG capsule 24 hr capsule      Rachele Gunn MD  [address]  [address]  [place] [address]  [phone]    Call in 1 day  for a follow-up appointment       Final diagnoses:   Acute right flank pain   Renal colic on right side   Right ureteral stone        ED Disposition       ED Disposition   Discharge    Condition   Stable    Comment   --               This medical record created using voice recognition software.             Jose Miguel Sunshine  MD  12/23/24 0157

## 2024-12-23 NOTE — TELEPHONE ENCOUNTER
Called to Triage pt due to referral.PT was able to answer one question before he stated that he was working and asked for a call back at a later time.   Passed any stones? No   Fever of 101 or greater?  Nausea or vomiting ?  Pain?  If yes to pain, location?  History of kidney stones?  History of surgical removal of kidney stones?

## 2024-12-24 LAB — BACTERIA SPEC AEROBE CULT: NO GROWTH

## 2024-12-27 ENCOUNTER — TELEPHONE (OUTPATIENT)
Dept: UROLOGY | Age: 49
End: 2024-12-27
Payer: COMMERCIAL

## 2024-12-27 DIAGNOSIS — N20.0 KIDNEY STONE: Primary | ICD-10-CM

## 2024-12-27 LAB
BACTERIA SPEC AEROBE CULT: NORMAL
BACTERIA SPEC AEROBE CULT: NORMAL

## 2024-12-27 NOTE — TELEPHONE ENCOUNTER
----- Message from Dennys BOOTH sent at 12/23/2024  9:09 AM EST -----  Regarding: APPT  REFERRAL IN [facility] ER F/U/LUIS WAS ON CALL/RECORDS IN [facility]/PLEASE ADVISE ON APPT??

## 2024-12-27 NOTE — TELEPHONE ENCOUNTER
Sp w/ pt to triage for KS; pt states he think he has passed the stone; he felt like something came out and he no longer has any pain; no n/v, fever either; RN explains will forward to MD and will potentially order OFELIA and do 4-6 wk f/u; informed may be getting a call for scheduling; pt is understanding and agreeable and has no further questions at this time.

## 2025-01-30 ENCOUNTER — TELEPHONE (OUTPATIENT)
Dept: UROLOGY | Age: 50
End: 2025-01-30
Payer: COMMERCIAL

## 2025-01-30 NOTE — TELEPHONE ENCOUNTER
CALLED PT. RE: PRE DENICE. PT. SCHED. FOR NP APPT. W/DERRICK OBREGON 02/06. LFT. MSG. FOR PT. TO CALL [facility] PRIOR TO APPT.   RMRUIZ 01/30/2025

## 2025-02-04 ENCOUNTER — HOSPITAL ENCOUNTER (OUTPATIENT)
Facility: HOSPITAL | Age: 50
Discharge: HOME | End: 2025-02-04
Admitting: UROLOGY
Payer: COMMERCIAL

## 2025-02-04 ENCOUNTER — TELEPHONE (OUTPATIENT)
Dept: UROLOGY | Age: 50
End: 2025-02-04
Payer: COMMERCIAL

## 2025-02-04 DIAGNOSIS — N20.0 KIDNEY STONE: ICD-10-CM

## 2025-02-04 PROCEDURE — 74018 RADEX ABDOMEN 1 VIEW: CPT

## 2025-02-04 NOTE — TELEPHONE ENCOUNTER
Call to pt made to see if can get KUB done today; pt states he should be able to; location given to pavilion; no further questions at this time

## 2025-02-06 ENCOUNTER — LAB (OUTPATIENT)
Facility: HOSPITAL | Age: 50
End: 2025-02-06
Payer: COMMERCIAL

## 2025-02-06 ENCOUNTER — PREP FOR SURGERY (OUTPATIENT)
Dept: OTHER | Facility: HOSPITAL | Age: 50
End: 2025-02-06
Payer: COMMERCIAL

## 2025-02-06 ENCOUNTER — OFFICE VISIT (OUTPATIENT)
Dept: UROLOGY | Age: 50
End: 2025-02-06
Payer: COMMERCIAL

## 2025-02-06 VITALS — WEIGHT: 207.01 LBS | HEIGHT: 65 IN | BODY MASS INDEX: 34.49 KG/M2 | RESPIRATION RATE: 12 BRPM

## 2025-02-06 DIAGNOSIS — N20.1 RIGHT URETERAL STONE: Primary | ICD-10-CM

## 2025-02-06 DIAGNOSIS — N20.0 STONE IN RENAL PELVIS: ICD-10-CM

## 2025-02-06 DIAGNOSIS — N20.0 NEPHROLITHIASIS: ICD-10-CM

## 2025-02-06 DIAGNOSIS — N20.1 RIGHT URETERAL STONE: ICD-10-CM

## 2025-02-06 DIAGNOSIS — R31.0 GROSS HEMATURIA: ICD-10-CM

## 2025-02-06 LAB
ANION GAP SERPL CALCULATED.3IONS-SCNC: 11.9 MMOL/L (ref 5–15)
BUN SERPL-MCNC: 20 MG/DL (ref 6–20)
BUN/CREAT SERPL: 12.7 (ref 7–25)
CALCIUM SPEC-SCNC: 9.6 MG/DL (ref 8.6–10.5)
CHLORIDE SERPL-SCNC: 104 MMOL/L (ref 98–107)
CO2 SERPL-SCNC: 26.1 MMOL/L (ref 22–29)
CREAT SERPL-MCNC: 1.57 MG/DL (ref 0.76–1.27)
EGFRCR SERPLBLD CKD-EPI 2021: 53.4 ML/MIN/1.73
GLUCOSE SERPL-MCNC: 95 MG/DL (ref 65–99)
POTASSIUM SERPL-SCNC: 3.9 MMOL/L (ref 3.5–5.2)
SODIUM SERPL-SCNC: 142 MMOL/L (ref 136–145)

## 2025-02-06 PROCEDURE — 80048 BASIC METABOLIC PNL TOTAL CA: CPT

## 2025-02-06 PROCEDURE — 36415 COLL VENOUS BLD VENIPUNCTURE: CPT

## 2025-02-06 PROCEDURE — 87086 URINE CULTURE/COLONY COUNT: CPT | Performed by: UROLOGY

## 2025-02-06 RX ORDER — OXYCODONE AND ACETAMINOPHEN 5; 325 MG/1; MG/1
.5-1 TABLET ORAL EVERY 6 HOURS PRN
Qty: 10 TABLET | Refills: 0 | Status: SHIPPED | OUTPATIENT
Start: 2025-02-06

## 2025-02-06 RX ORDER — TAMSULOSIN HYDROCHLORIDE 0.4 MG/1
1 CAPSULE ORAL DAILY
Qty: 30 CAPSULE | Refills: 0 | Status: SHIPPED | OUTPATIENT
Start: 2025-02-06

## 2025-02-06 NOTE — PROGRESS NOTES
UROLOGY OFFICE H&P NOTE    Subjective   HPI  Isidoro Estrella is a 49 y.o. male.  Presents for evaluation right ureteral stone diagnosed at ER visit 12/23/2024.  Patient has performed trial of passage, KUB prior to his visit.  History of Present Illness  He was diagnosed with a ureteral stone following a CT scan, which he believes has not yet been passed.   He reports experiencing sharp pain in the right groin area, a symptom that has been persistent since [place].   Over the past two days, his pain has significantly worsened, and he noticed blood in his urine this morning.     He has no history of left-sided pain or fevers. He has no issues with nausea.     He has been managing his pain with ibuprofen and Tylenol.     His medical history includes two instances of kidney stones, each requiring surgical intervention for removal.   He is unaware of the composition of his stones.     With onset of the right sided pain, he sought emergency care due to the severity of his symptoms. He was informed that the stone measured 6 mm and should be passable, but was referred for further evaluation. He was prescribed Flomax in ER but only received 4 days.      ________  KUB 2/4/2025: Pending; reviewed by me; left nephrolithiasis noted; no obvious right ureteral stone    CT abdomen pelvis without contrast 12/23/2024:.Mild right hydronephrosis secondary to a 6 mm stone in the proximal right ureter.  2.Bilateral nonobstructing kidney stones. There is a large calcification in the left renal pelvis which may be causing some minimal dilatation of the left renal pelvis. This is new since the previous study.  3.Hepatic steatosis.  4.Small fat-containing umbilical hernia.    Labs 12/22/2024:  Creatinine: 1.39 (baseline around 1)  WBC 13.37  UA TNTC RBCs; 21-50 WBCs; no bacteria; small leukocyte esterase, nitrite negative          Medical History:  Past Medical History:   Diagnosis Date    Allergic     Colon polyps     Gallstones      Hypertension     Kidney stone         Social History:  Social History     Socioeconomic History    Marital status:    Tobacco Use    Smoking status: Never     Passive exposure: Never    Smokeless tobacco: Never   Vaping Use    Vaping status: Never Used   Substance and Sexual Activity    Alcohol use: Never    Drug use: Never    Sexual activity: Defer        Family History:  Family History   Problem Relation Age of Onset    Diabetes Father         Surgical History:  Past Surgical History:   Procedure Laterality Date    APPENDECTOMY      COLONOSCOPY N/A 9/6/2022    Procedure: COLONOSCOPY WITH POLYPECTOMIES, HOT SNARE;  Surgeon: Sharif Lopez MD;  Location: [facility] ENDOSCOPY;  Service: General;  Laterality: N/A;  COLON POLYPS    COLONOSCOPY N/A 7/21/2023    Procedure: COLONOSCOPY;  Surgeon: Sharif Lopez MD;  Location: [facility] ENDOSCOPY;  Service: General;  Laterality: N/A;  NORMAL    KIDNEY STONE SURGERY          Allergies:  Allergies   Allergen Reactions    Latex Itching and Swelling    Penicillins Rash        Current Medications:  Current Outpatient Medications   Medication Sig Dispense Refill    cetirizine (zyrTEC) 10 MG tablet Take 1 tablet by mouth Daily. 90 tablet 2    Cholecalciferol 50 MCG (2000 UT) capsule Take 1 capsule by mouth Daily. 90 each 3    ondansetron ODT (ZOFRAN-ODT) 4 MG disintegrating tablet Place 1 tablet on the tongue Every 6 (Six) Hours As Needed for Nausea or Vomiting. 10 tablet 0    tamsulosin (FLOMAX) 0.4 MG capsule 24 hr capsule Take 1 capsule by mouth Daily. 7 capsule 0    amLODIPine-benazepril (LOTREL) 10-20 MG per capsule TAKE 1 CAPSULE BY MOUTH DAILY (Patient not taking: Reported on 2/6/2025) 90 capsule 2    Elastic Bandages & Supports (Wrist Brace Deluxe) misc 1 Units Every Night. (Patient not taking: Reported on 2/6/2025) 2 each 0    naloxone (NARCAN) 4 MG/0.1ML nasal spray Call 911. Don't prime. [place] in 1 nostril for overdose. Repeat in 2-3 minutes in other  "nostril if no or minimal breathing/responsiveness. 2 each 0    oxyCODONE-acetaminophen (PERCOCET) 5-325 MG per tablet Take 0.5-1 tablets by mouth Every 6 (Six) Hours As Needed for Severe Pain. 10 tablet 0    oxyCODONE-acetaminophen (PERCOCET) 7.5-325 MG per tablet Take 1 tablet by mouth Every 6 (Six) Hours As Needed for Severe Pain or Moderate Pain. 12 tablet 0    sertraline (Zoloft) 25 MG tablet Take 75 mg by mouth daily (one 50 mg tab, and one 25 mg tab) (Patient not taking: Reported on 2/6/2025) 90 tablet 2    sertraline (Zoloft) 50 MG tablet Take 75 mg by mouth daily (one 50 mg tab, and one 25 mg tab) (Patient not taking: Reported on 2/6/2025) 90 tablet 2    tamsulosin (FLOMAX) 0.4 MG capsule 24 hr capsule Take 1 capsule by mouth Daily. 30 capsule 0     No current facility-administered medications for this visit.       Review of systems  A review of systems was performed, and positive findings are noted in the HPI.    Objective     Vital Signs:   Resp 12   Ht 165.1 cm (65\")   Wt 93.9 kg (207 lb 0.2 oz)   BMI 34.45 kg/m²       Physical exam  No acute distress, uncomfortable, well-nourished  Lungs: Clear, unlabored  CV: Regular rate, no chest retractions  Awake alert and oriented  Mood normal; affect normal  Physical Exam        Problem List:  Patient Active Problem List   Diagnosis    Obesity (BMI 30.0-34.9)    Hypercalciuria    Screening for malignant neoplasm of colon    History of colonic polyps    Mixed hyperlipidemia    Prediabetes    Essential hypertension    Right ureteral stone    Stone in renal pelvis    Nephrolithiasis       Assessment & Plan   Diagnoses and all orders for this visit:    1. Right ureteral stone (Primary)  -     tamsulosin (FLOMAX) 0.4 MG capsule 24 hr capsule; Take 1 capsule by mouth Daily.  Dispense: 30 capsule; Refill: 0  -     oxyCODONE-acetaminophen (PERCOCET) 5-325 MG per tablet; Take 0.5-1 tablets by mouth Every 6 (Six) Hours As Needed for Severe Pain.  Dispense: 10 tablet; " Refill: 0  -     naloxone (NARCAN) 4 MG/0.1ML nasal spray; Call 911. Don't prime. [place] in 1 nostril for overdose. Repeat in 2-3 minutes in other nostril if no or minimal breathing/responsiveness.  Dispense: 2 each; Refill: 0  -     Cancel: Basic Metabolic Panel; Future  -     Basic Metabolic Panel; Future    2. Stone in renal pelvis  -     naloxone (NARCAN) 4 MG/0.1ML nasal spray; Call 911. Don't prime. [place] in 1 nostril for overdose. Repeat in 2-3 minutes in other nostril if no or minimal breathing/responsiveness.  Dispense: 2 each; Refill: 0  -     Cancel: Basic Metabolic Panel; Future  -     Basic Metabolic Panel; Future    3. Nephrolithiasis    4. Gross hematuria  -     Urine Culture - Urine, Urine, Clean Catch      CT scan imaging personally reviewed by me, demonstrated discussed with patient at length; ER records and labs also reviewed    Patient with persistent right sided flank pain, notes to be severe at times; denies fever or emesis.  Has been tolerating pain with OTC meds.  Out of Flomax.  KUB imaging without obvious right ureteral stone but patient states he has not passed it.    Large left-sided stone burden including large renal pelvis stone over 2 cm.  Also stone within the lower pole.  Believed to be better candidate for ureteroscopy rather than PCNL given these findings.    Warrants the stone intervention bilaterally  Assessment & Plan  Flomax and Percocet will be prescribed for continued medical expulsive therapy and symptom control until OR.     Blood work will be ordered to assess current kidney function.     He is advised to monitor for fever, emesis,or uncontrolled pain, which would necessitate an emergency room visit.     If the right-sided stone is passed before the scheduled surgery, the left-sided stone will still be addressed.      Will proceed with cystoscopy, bilateral retrograde pyelogram, ureteroscopy, laser lithotripsy, stent placement.  Risks, benefits and alternatives were  discussed with patient including bleeding, infection, damage to surrounding structures, stone recurrence, stricture pain, further procedures or management, and risks of anesthesia including up to death.  Patient also notes understanding that if unable to reach the level of the stone or if significant purulent discharge noted upon initiation of procedure that stent will be placed in definitive stone management will be deferred until the collecting system is optimized.      Additional risk of needing to do this in a staged manner with additional trips to the operating room in order to render him stone free given significant stone burden.  He notes understanding of this.    Patient a participated and and notes understanding of the above discussion and is agreeable to proceed.      Schedule OR  All question addressed at this time.           Patient or patient representative verbalized consent for the use of Ambient Listening during the visit with  Rachele Gunn MD for chart documentation. 2/7/2025  06:10 EST

## 2025-02-06 NOTE — H&P (VIEW-ONLY)
UROLOGY OFFICE H&P NOTE    Subjective   HPI  Isidoro Estrella is a 49 y.o. male.  Presents for evaluation right ureteral stone diagnosed at ER visit 12/23/2024.  Patient has performed trial of passage, KUB prior to his visit.  History of Present Illness  He was diagnosed with a ureteral stone following a CT scan, which he believes has not yet been passed.   He reports experiencing sharp pain in the right groin area, a symptom that has been persistent since [place].   Over the past two days, his pain has significantly worsened, and he noticed blood in his urine this morning.     He has no history of left-sided pain or fevers. He has no issues with nausea.     He has been managing his pain with ibuprofen and Tylenol.     His medical history includes two instances of kidney stones, each requiring surgical intervention for removal.   He is unaware of the composition of his stones.     With onset of the right sided pain, he sought emergency care due to the severity of his symptoms. He was informed that the stone measured 6 mm and should be passable, but was referred for further evaluation. He was prescribed Flomax in ER but only received 4 days.      ________  KUB 2/4/2025: Pending; reviewed by me; left nephrolithiasis noted; no obvious right ureteral stone    CT abdomen pelvis without contrast 12/23/2024:.Mild right hydronephrosis secondary to a 6 mm stone in the proximal right ureter.  2.Bilateral nonobstructing kidney stones. There is a large calcification in the left renal pelvis which may be causing some minimal dilatation of the left renal pelvis. This is new since the previous study.  3.Hepatic steatosis.  4.Small fat-containing umbilical hernia.    Labs 12/22/2024:  Creatinine: 1.39 (baseline around 1)  WBC 13.37  UA TNTC RBCs; 21-50 WBCs; no bacteria; small leukocyte esterase, nitrite negative          Medical History:  Past Medical History:   Diagnosis Date    Allergic     Colon polyps     Gallstones      Hypertension     Kidney stone         Social History:  Social History     Socioeconomic History    Marital status:    Tobacco Use    Smoking status: Never     Passive exposure: Never    Smokeless tobacco: Never   Vaping Use    Vaping status: Never Used   Substance and Sexual Activity    Alcohol use: Never    Drug use: Never    Sexual activity: Defer        Family History:  Family History   Problem Relation Age of Onset    Diabetes Father         Surgical History:  Past Surgical History:   Procedure Laterality Date    APPENDECTOMY      COLONOSCOPY N/A 9/6/2022    Procedure: COLONOSCOPY WITH POLYPECTOMIES, HOT SNARE;  Surgeon: Sharif Lopez MD;  Location: [facility] ENDOSCOPY;  Service: General;  Laterality: N/A;  COLON POLYPS    COLONOSCOPY N/A 7/21/2023    Procedure: COLONOSCOPY;  Surgeon: Sharif Lopez MD;  Location: [facility] ENDOSCOPY;  Service: General;  Laterality: N/A;  NORMAL    KIDNEY STONE SURGERY          Allergies:  Allergies   Allergen Reactions    Latex Itching and Swelling    Penicillins Rash        Current Medications:  Current Outpatient Medications   Medication Sig Dispense Refill    cetirizine (zyrTEC) 10 MG tablet Take 1 tablet by mouth Daily. 90 tablet 2    Cholecalciferol 50 MCG (2000 UT) capsule Take 1 capsule by mouth Daily. 90 each 3    ondansetron ODT (ZOFRAN-ODT) 4 MG disintegrating tablet Place 1 tablet on the tongue Every 6 (Six) Hours As Needed for Nausea or Vomiting. 10 tablet 0    tamsulosin (FLOMAX) 0.4 MG capsule 24 hr capsule Take 1 capsule by mouth Daily. 7 capsule 0    amLODIPine-benazepril (LOTREL) 10-20 MG per capsule TAKE 1 CAPSULE BY MOUTH DAILY (Patient not taking: Reported on 2/6/2025) 90 capsule 2    Elastic Bandages & Supports (Wrist Brace Deluxe) misc 1 Units Every Night. (Patient not taking: Reported on 2/6/2025) 2 each 0    naloxone (NARCAN) 4 MG/0.1ML nasal spray Call 911. Don't prime. [place] in 1 nostril for overdose. Repeat in 2-3 minutes in other  "nostril if no or minimal breathing/responsiveness. 2 each 0    oxyCODONE-acetaminophen (PERCOCET) 5-325 MG per tablet Take 0.5-1 tablets by mouth Every 6 (Six) Hours As Needed for Severe Pain. 10 tablet 0    oxyCODONE-acetaminophen (PERCOCET) 7.5-325 MG per tablet Take 1 tablet by mouth Every 6 (Six) Hours As Needed for Severe Pain or Moderate Pain. 12 tablet 0    sertraline (Zoloft) 25 MG tablet Take 75 mg by mouth daily (one 50 mg tab, and one 25 mg tab) (Patient not taking: Reported on 2/6/2025) 90 tablet 2    sertraline (Zoloft) 50 MG tablet Take 75 mg by mouth daily (one 50 mg tab, and one 25 mg tab) (Patient not taking: Reported on 2/6/2025) 90 tablet 2    tamsulosin (FLOMAX) 0.4 MG capsule 24 hr capsule Take 1 capsule by mouth Daily. 30 capsule 0     No current facility-administered medications for this visit.       Review of systems  A review of systems was performed, and positive findings are noted in the HPI.    Objective     Vital Signs:   Resp 12   Ht 165.1 cm (65\")   Wt 93.9 kg (207 lb 0.2 oz)   BMI 34.45 kg/m²       Physical exam  No acute distress, uncomfortable, well-nourished  Lungs: Clear, unlabored  CV: Regular rate, no chest retractions  Awake alert and oriented  Mood normal; affect normal  Physical Exam        Problem List:  Patient Active Problem List   Diagnosis    Obesity (BMI 30.0-34.9)    Hypercalciuria    Screening for malignant neoplasm of colon    History of colonic polyps    Mixed hyperlipidemia    Prediabetes    Essential hypertension    Right ureteral stone    Stone in renal pelvis    Nephrolithiasis       Assessment & Plan   Diagnoses and all orders for this visit:    1. Right ureteral stone (Primary)  -     tamsulosin (FLOMAX) 0.4 MG capsule 24 hr capsule; Take 1 capsule by mouth Daily.  Dispense: 30 capsule; Refill: 0  -     oxyCODONE-acetaminophen (PERCOCET) 5-325 MG per tablet; Take 0.5-1 tablets by mouth Every 6 (Six) Hours As Needed for Severe Pain.  Dispense: 10 tablet; " Refill: 0  -     naloxone (NARCAN) 4 MG/0.1ML nasal spray; Call 911. Don't prime. [place] in 1 nostril for overdose. Repeat in 2-3 minutes in other nostril if no or minimal breathing/responsiveness.  Dispense: 2 each; Refill: 0  -     Cancel: Basic Metabolic Panel; Future  -     Basic Metabolic Panel; Future    2. Stone in renal pelvis  -     naloxone (NARCAN) 4 MG/0.1ML nasal spray; Call 911. Don't prime. [place] in 1 nostril for overdose. Repeat in 2-3 minutes in other nostril if no or minimal breathing/responsiveness.  Dispense: 2 each; Refill: 0  -     Cancel: Basic Metabolic Panel; Future  -     Basic Metabolic Panel; Future    3. Nephrolithiasis    4. Gross hematuria  -     Urine Culture - Urine, Urine, Clean Catch      CT scan imaging personally reviewed by me, demonstrated discussed with patient at length; ER records and labs also reviewed    Patient with persistent right sided flank pain, notes to be severe at times; denies fever or emesis.  Has been tolerating pain with OTC meds.  Out of Flomax.  KUB imaging without obvious right ureteral stone but patient states he has not passed it.    Large left-sided stone burden including large renal pelvis stone over 2 cm.  Also stone within the lower pole.  Believed to be better candidate for ureteroscopy rather than PCNL given these findings.    Warrants the stone intervention bilaterally  Assessment & Plan  Flomax and Percocet will be prescribed for continued medical expulsive therapy and symptom control until OR.     Blood work will be ordered to assess current kidney function.     He is advised to monitor for fever, emesis,or uncontrolled pain, which would necessitate an emergency room visit.     If the right-sided stone is passed before the scheduled surgery, the left-sided stone will still be addressed.      Will proceed with cystoscopy, bilateral retrograde pyelogram, ureteroscopy, laser lithotripsy, stent placement.  Risks, benefits and alternatives were  discussed with patient including bleeding, infection, damage to surrounding structures, stone recurrence, stricture pain, further procedures or management, and risks of anesthesia including up to death.  Patient also notes understanding that if unable to reach the level of the stone or if significant purulent discharge noted upon initiation of procedure that stent will be placed in definitive stone management will be deferred until the collecting system is optimized.      Additional risk of needing to do this in a staged manner with additional trips to the operating room in order to render him stone free given significant stone burden.  He notes understanding of this.    Patient a participated and and notes understanding of the above discussion and is agreeable to proceed.      Schedule OR  All question addressed at this time.           Patient or patient representative verbalized consent for the use of Ambient Listening during the visit with  Rachele Gunn MD for chart documentation. 2/7/2025  06:10 EST

## 2025-02-07 LAB — BACTERIA SPEC AEROBE CULT: NO GROWTH

## 2025-02-12 NOTE — PRE-PROCEDURE INSTRUCTIONS
PATIENT INSTRUCTED TO BE:    - NOTHING TO EAT, DRINK OR CHEW AFTER MIDNIGHT      - TO HOLD ALL VITAMINS, SUPPLEMENTS, NSAIDS FOR ONE WEEK PRIOR TO THEIR SURGICAL PROCEDURE    - INSTRUCTED PT TO USE SURGICAL SOAP 1 TIME THE NIGHT PRIOR TO SURGERY __2/13_________ OR THE AM OF SURGERY ___2/14__________   USE THE SOAP FROM NECK TO TOES, AVOID THEIR FACE, HAIR, AND PRIVATE PARTS. IF USE THE SOAP THE NIGHT PRIOR TO SURGERY, CHANGE BED LINENS AND NO PETS IN THE BED.     INSTRUCTED NO LOTIONS, JEWELRY, PIERCINGS,  NAIL POLISH, OR DEODORANT DAY OF SURGERY    - IF DIABETIC, CHECK BLOOD GLUCOSE IF LESS THAN 70 OR HAVING SYMPTOMS CALL THE PREOP AREA FOR INSTRUCTIONS ON AM OF SURGERY ([phone])    -INSTRUCTED TO TAKE THE FOLLOWING MEDICATIONS THE DAY OF SURGERY WITH SIPS OF WATER: CETIRIZINE, FLOMAX    - DO NOT BRING ANY MEDICATIONS WITH YOU TO THE HOSPITAL THE DAY OF SURGERY, EXCEPT IF USE INHALERS. BRING INHALERS DAY OF SURGERY       - BRING CPAP OR BIPAP TO THE HOSPITAL ONLY IF YOU ARE SPENDING THE NIGHT    - DO NOT SMOKE OR VAPE 24 HOURS PRIOR TO PROCEDURE PER ANESTHESIA REQUEST     -MAKE SURE YOU HAVE A RIDE HOME OR SOMEONE TO STAY WITH YOU THE DAY OF THE PROCEDURE AFTER YOU GO HOME     - FOLLOW ANY OTHER INSTRUCTIONS GIVEN TO YOU BY YOUR SURGEON'S OFFICE.     - DAY OF SURGERY __2/14__________,MAIN ENTRANCE [facility]. TAKE ELEVATOR TO FIRST FLOOR, TURN LEFT AND CHECK IN WITH REGISTRATION    - YOU WILL RECEIVE A PHONE CALL THE DAY PRIOR TO SURGERY BETWEEN 1PM AND 4 PM WITH ARRIVAL TIME, IF YOUR SURGERY IS ON A MONDAY YOU WILL RECEIVE A CALL THE FRIDAY PRIOR TO SURGERY DATE    - BRING CASH OR CREDIT CARD FOR COPAYMENT OF MEDICATIONS AFTER SURGERY IF YOU USE THE HOSPITAL PHARMACY (MED[facility] TO BED)    - PREADMISSION TESTING NURSE TERRY ANDERSON [phone] IF HAVE ANY QUESTIONS     -PATIENT PROVIDED THE NUMBER FOR PRE[facility] SURGICAL DEPT IF HAD QUESTIONS AFTER HOURS PRIOR TO SURGERY ([phone]).  INFORMED PT IF NO  ANSWER, LEAVE A MESSAGE AND SOMEONE WILL RETURN THEIR CALL       PATIENT VERBALIZED UNDERSTANDING

## 2025-02-14 ENCOUNTER — HOSPITAL ENCOUNTER (OUTPATIENT)
Facility: HOSPITAL | Age: 50
Setting detail: HOSPITAL OUTPATIENT SURGERY
Discharge: HOME | End: 2025-02-14
Attending: UROLOGY | Admitting: UROLOGY
Payer: COMMERCIAL

## 2025-02-14 ENCOUNTER — ANESTHESIA EVENT (OUTPATIENT)
Dept: PERIOP | Facility: HOSPITAL | Age: 50
End: 2025-02-14
Payer: COMMERCIAL

## 2025-02-14 ENCOUNTER — APPOINTMENT (OUTPATIENT)
Dept: GENERAL RADIOLOGY | Facility: HOSPITAL | Age: 50
End: 2025-02-14
Payer: COMMERCIAL

## 2025-02-14 ENCOUNTER — ANESTHESIA (OUTPATIENT)
Dept: PERIOP | Facility: HOSPITAL | Age: 50
End: 2025-02-14
Payer: COMMERCIAL

## 2025-02-14 VITALS
SYSTOLIC BLOOD PRESSURE: 151 MMHG | HEART RATE: 79 BPM | WEIGHT: 191.8 LBS | DIASTOLIC BLOOD PRESSURE: 99 MMHG | HEIGHT: 65 IN | RESPIRATION RATE: 18 BRPM | TEMPERATURE: 98.2 F | OXYGEN SATURATION: 100 % | BODY MASS INDEX: 31.96 KG/M2

## 2025-02-14 DIAGNOSIS — N20.0 NEPHROLITHIASIS: ICD-10-CM

## 2025-02-14 DIAGNOSIS — N20.0 STONE IN RENAL PELVIS: ICD-10-CM

## 2025-02-14 DIAGNOSIS — N20.1 RIGHT URETERAL STONE: ICD-10-CM

## 2025-02-14 PROCEDURE — 25010000002 KETOROLAC TROMETHAMINE PER 15 MG: Performed by: ANESTHESIOLOGY

## 2025-02-14 PROCEDURE — 25810000003 LACTATED RINGERS PER 1000 ML: Performed by: ANESTHESIOLOGY

## 2025-02-14 PROCEDURE — C1769 GUIDE WIRE: HCPCS | Performed by: UROLOGY

## 2025-02-14 PROCEDURE — 25010000002 CEFAZOLIN PER 500 MG: Performed by: UROLOGY

## 2025-02-14 PROCEDURE — 93005 ELECTROCARDIOGRAM TRACING: CPT | Performed by: ANESTHESIOLOGY

## 2025-02-14 PROCEDURE — 25010000002 DEXAMETHASONE PER 1 MG

## 2025-02-14 PROCEDURE — C1894 INTRO/SHEATH, NON-LASER: HCPCS | Performed by: UROLOGY

## 2025-02-14 PROCEDURE — 74420 UROGRAPHY RTRGR +-KUB: CPT | Performed by: UROLOGY

## 2025-02-14 PROCEDURE — 25010000002 FENTANYL CITRATE (PF) 50 MCG/ML SOLUTION

## 2025-02-14 PROCEDURE — 82365 CALCULUS SPECTROSCOPY: CPT | Performed by: UROLOGY

## 2025-02-14 PROCEDURE — 25010000002 ONDANSETRON PER 1 MG

## 2025-02-14 PROCEDURE — 93010 ELECTROCARDIOGRAM REPORT: CPT | Performed by: STUDENT IN AN ORGANIZED HEALTH CARE EDUCATION/TRAINING PROGRAM

## 2025-02-14 PROCEDURE — 25010000002 SUGAMMADEX 200 MG/2ML SOLUTION

## 2025-02-14 PROCEDURE — 25010000002 LIDOCAINE PF 2% 2 % SOLUTION

## 2025-02-14 PROCEDURE — C1758 CATHETER, URETERAL: HCPCS | Performed by: UROLOGY

## 2025-02-14 PROCEDURE — 25010000002 MIDAZOLAM PER 1MG: Performed by: ANESTHESIOLOGY

## 2025-02-14 PROCEDURE — 25010000002 KETOROLAC TROMETHAMINE PER 15 MG

## 2025-02-14 PROCEDURE — 88300 SURGICAL PATH GROSS: CPT | Performed by: UROLOGY

## 2025-02-14 PROCEDURE — C2617 STENT, NON-COR, TEM W/O DEL: HCPCS | Performed by: UROLOGY

## 2025-02-14 PROCEDURE — 52356 CYSTO/URETERO W/LITHOTRIPSY: CPT | Performed by: UROLOGY

## 2025-02-14 PROCEDURE — 25510000001 IOPAMIDOL PER 1 ML: Performed by: UROLOGY

## 2025-02-14 PROCEDURE — 25010000002 PROPOFOL 10 MG/ML EMULSION

## 2025-02-14 PROCEDURE — 76000 FLUOROSCOPY <1 HR PHYS/QHP: CPT

## 2025-02-14 DEVICE — URETERAL STENT
Type: IMPLANTABLE DEVICE | Site: URETER | Status: FUNCTIONAL
Brand: ASCERTA™

## 2025-02-14 RX ORDER — DEXAMETHASONE SODIUM PHOSPHATE 4 MG/ML
INJECTION, SOLUTION INTRA-ARTICULAR; INTRALESIONAL; INTRAMUSCULAR; INTRAVENOUS; SOFT TISSUE AS NEEDED
Status: DISCONTINUED | OUTPATIENT
Start: 2025-02-14 | End: 2025-02-14 | Stop reason: SURG

## 2025-02-14 RX ORDER — PROMETHAZINE HYDROCHLORIDE 25 MG/1
25 TABLET ORAL ONCE AS NEEDED
Status: DISCONTINUED | OUTPATIENT
Start: 2025-02-14 | End: 2025-02-14 | Stop reason: HOSPADM

## 2025-02-14 RX ORDER — ACETAMINOPHEN 325 MG/1
650 TABLET ORAL ONCE
Status: DISCONTINUED | OUTPATIENT
Start: 2025-02-14 | End: 2025-02-14 | Stop reason: HOSPADM

## 2025-02-14 RX ORDER — ONDANSETRON 4 MG/1
4 TABLET, ORALLY DISINTEGRATING ORAL EVERY 6 HOURS PRN
Qty: 30 TABLET | Refills: 0 | Status: SHIPPED | OUTPATIENT
Start: 2025-02-14

## 2025-02-14 RX ORDER — PROMETHAZINE HYDROCHLORIDE 25 MG/1
25 SUPPOSITORY RECTAL ONCE AS NEEDED
Status: DISCONTINUED | OUTPATIENT
Start: 2025-02-14 | End: 2025-02-14 | Stop reason: HOSPADM

## 2025-02-14 RX ORDER — MAGNESIUM HYDROXIDE 1200 MG/15ML
LIQUID ORAL AS NEEDED
Status: DISCONTINUED | OUTPATIENT
Start: 2025-02-14 | End: 2025-02-14 | Stop reason: HOSPADM

## 2025-02-14 RX ORDER — OXYCODONE AND ACETAMINOPHEN 5; 325 MG/1; MG/1
.5-2 TABLET ORAL EVERY 6 HOURS PRN
Qty: 16 TABLET | Refills: 0 | Status: SHIPPED | OUTPATIENT
Start: 2025-02-14 | End: 2025-02-14

## 2025-02-14 RX ORDER — TAMSULOSIN HYDROCHLORIDE 0.4 MG/1
1 CAPSULE ORAL DAILY
Qty: 10 CAPSULE | Refills: 0 | Status: SHIPPED | OUTPATIENT
Start: 2025-02-14

## 2025-02-14 RX ORDER — PHENAZOPYRIDINE HYDROCHLORIDE 200 MG/1
200 TABLET, FILM COATED ORAL 3 TIMES DAILY PRN
Qty: 20 TABLET | Refills: 0 | Status: SHIPPED | OUTPATIENT
Start: 2025-02-14 | End: 2025-02-14

## 2025-02-14 RX ORDER — IBUPROFEN 800 MG/1
800 TABLET, FILM COATED ORAL EVERY 6 HOURS PRN
Qty: 30 TABLET | Refills: 0 | Status: SHIPPED | OUTPATIENT
Start: 2025-02-14

## 2025-02-14 RX ORDER — IBUPROFEN 600 MG/1
600 TABLET, FILM COATED ORAL EVERY 6 HOURS PRN
Status: DISCONTINUED | OUTPATIENT
Start: 2025-02-14 | End: 2025-02-14 | Stop reason: HOSPADM

## 2025-02-14 RX ORDER — KETOROLAC TROMETHAMINE 30 MG/ML
INJECTION, SOLUTION INTRAMUSCULAR; INTRAVENOUS AS NEEDED
Status: DISCONTINUED | OUTPATIENT
Start: 2025-02-14 | End: 2025-02-14 | Stop reason: SURG

## 2025-02-14 RX ORDER — PHENAZOPYRIDINE HYDROCHLORIDE 200 MG/1
200 TABLET, FILM COATED ORAL 3 TIMES DAILY PRN
Qty: 20 TABLET | Refills: 0 | Status: SHIPPED | OUTPATIENT
Start: 2025-02-14

## 2025-02-14 RX ORDER — ONDANSETRON 2 MG/ML
4 INJECTION INTRAMUSCULAR; INTRAVENOUS ONCE AS NEEDED
Status: DISCONTINUED | OUTPATIENT
Start: 2025-02-14 | End: 2025-02-14 | Stop reason: HOSPADM

## 2025-02-14 RX ORDER — ROCURONIUM BROMIDE 10 MG/ML
INJECTION, SOLUTION INTRAVENOUS AS NEEDED
Status: DISCONTINUED | OUTPATIENT
Start: 2025-02-14 | End: 2025-02-14 | Stop reason: SURG

## 2025-02-14 RX ORDER — LIDOCAINE HYDROCHLORIDE 20 MG/ML
INJECTION, SOLUTION EPIDURAL; INFILTRATION; INTRACAUDAL; PERINEURAL AS NEEDED
Status: DISCONTINUED | OUTPATIENT
Start: 2025-02-14 | End: 2025-02-14 | Stop reason: SURG

## 2025-02-14 RX ORDER — OXYBUTYNIN CHLORIDE 5 MG/1
5 TABLET ORAL 3 TIMES DAILY PRN
Qty: 12 TABLET | Refills: 0 | Status: SHIPPED | OUTPATIENT
Start: 2025-02-14

## 2025-02-14 RX ORDER — KETOROLAC TROMETHAMINE 30 MG/ML
30 INJECTION, SOLUTION INTRAMUSCULAR; INTRAVENOUS ONCE
Status: COMPLETED | OUTPATIENT
Start: 2025-02-14 | End: 2025-02-14

## 2025-02-14 RX ORDER — ONDANSETRON 4 MG/1
4 TABLET, ORALLY DISINTEGRATING ORAL ONCE AS NEEDED
Status: DISCONTINUED | OUTPATIENT
Start: 2025-02-14 | End: 2025-02-14 | Stop reason: HOSPADM

## 2025-02-14 RX ORDER — PROMETHAZINE HYDROCHLORIDE 12.5 MG/1
12.5 TABLET ORAL ONCE AS NEEDED
Status: DISCONTINUED | OUTPATIENT
Start: 2025-02-14 | End: 2025-02-14 | Stop reason: HOSPADM

## 2025-02-14 RX ORDER — ONDANSETRON 4 MG/1
4 TABLET, ORALLY DISINTEGRATING ORAL EVERY 6 HOURS PRN
Qty: 30 TABLET | Refills: 0 | Status: SHIPPED | OUTPATIENT
Start: 2025-02-14 | End: 2025-02-14

## 2025-02-14 RX ORDER — SODIUM CHLORIDE, SODIUM LACTATE, POTASSIUM CHLORIDE, CALCIUM CHLORIDE 600; 310; 30; 20 MG/100ML; MG/100ML; MG/100ML; MG/100ML
20 INJECTION, SOLUTION INTRAVENOUS CONTINUOUS PRN
Status: DISCONTINUED | OUTPATIENT
Start: 2025-02-14 | End: 2025-02-14 | Stop reason: HOSPADM

## 2025-02-14 RX ORDER — MIDAZOLAM HYDROCHLORIDE 2 MG/2ML
2 INJECTION, SOLUTION INTRAMUSCULAR; INTRAVENOUS ONCE
Status: COMPLETED | OUTPATIENT
Start: 2025-02-14 | End: 2025-02-14

## 2025-02-14 RX ORDER — LIDOCAINE HYDROCHLORIDE 20 MG/ML
JELLY TOPICAL AS NEEDED
Status: DISCONTINUED | OUTPATIENT
Start: 2025-02-14 | End: 2025-02-14 | Stop reason: HOSPADM

## 2025-02-14 RX ORDER — IBUPROFEN 800 MG/1
800 TABLET, FILM COATED ORAL EVERY 6 HOURS PRN
Qty: 30 TABLET | Refills: 0 | Status: SHIPPED | OUTPATIENT
Start: 2025-02-14 | End: 2025-02-14

## 2025-02-14 RX ORDER — FENTANYL CITRATE 50 UG/ML
INJECTION, SOLUTION INTRAMUSCULAR; INTRAVENOUS AS NEEDED
Status: DISCONTINUED | OUTPATIENT
Start: 2025-02-14 | End: 2025-02-14 | Stop reason: SURG

## 2025-02-14 RX ORDER — OXYBUTYNIN CHLORIDE 5 MG/1
5 TABLET ORAL 3 TIMES DAILY PRN
Qty: 12 TABLET | Refills: 0 | Status: SHIPPED | OUTPATIENT
Start: 2025-02-14 | End: 2025-02-14

## 2025-02-14 RX ORDER — TAMSULOSIN HYDROCHLORIDE 0.4 MG/1
1 CAPSULE ORAL DAILY
Qty: 10 CAPSULE | Refills: 0 | Status: SHIPPED | OUTPATIENT
Start: 2025-02-14 | End: 2025-02-14

## 2025-02-14 RX ORDER — ONDANSETRON 2 MG/ML
INJECTION INTRAMUSCULAR; INTRAVENOUS AS NEEDED
Status: DISCONTINUED | OUTPATIENT
Start: 2025-02-14 | End: 2025-02-14 | Stop reason: SURG

## 2025-02-14 RX ORDER — PROPOFOL 10 MG/ML
VIAL (ML) INTRAVENOUS AS NEEDED
Status: DISCONTINUED | OUTPATIENT
Start: 2025-02-14 | End: 2025-02-14 | Stop reason: SURG

## 2025-02-14 RX ORDER — IOPAMIDOL 510 MG/ML
INJECTION, SOLUTION INTRAVASCULAR AS NEEDED
Status: DISCONTINUED | OUTPATIENT
Start: 2025-02-14 | End: 2025-02-14 | Stop reason: HOSPADM

## 2025-02-14 RX ORDER — OXYCODONE HYDROCHLORIDE 5 MG/1
5 TABLET ORAL
Status: DISCONTINUED | OUTPATIENT
Start: 2025-02-14 | End: 2025-02-14 | Stop reason: HOSPADM

## 2025-02-14 RX ORDER — DEXMEDETOMIDINE HYDROCHLORIDE 100 UG/ML
INJECTION, SOLUTION INTRAVENOUS AS NEEDED
Status: DISCONTINUED | OUTPATIENT
Start: 2025-02-14 | End: 2025-02-14 | Stop reason: SURG

## 2025-02-14 RX ORDER — ACETAMINOPHEN 500 MG
1000 TABLET ORAL ONCE
Status: COMPLETED | OUTPATIENT
Start: 2025-02-14 | End: 2025-02-14

## 2025-02-14 RX ORDER — OXYCODONE AND ACETAMINOPHEN 5; 325 MG/1; MG/1
.5-2 TABLET ORAL EVERY 6 HOURS PRN
Qty: 16 TABLET | Refills: 0 | Status: SHIPPED | OUTPATIENT
Start: 2025-02-14

## 2025-02-14 RX ADMIN — DEXMEDETOMIDINE 10 MCG: 100 INJECTION, SOLUTION, CONCENTRATE INTRAVENOUS at 13:56

## 2025-02-14 RX ADMIN — DEXMEDETOMIDINE 10 MCG: 100 INJECTION, SOLUTION, CONCENTRATE INTRAVENOUS at 13:30

## 2025-02-14 RX ADMIN — OXYCODONE HYDROCHLORIDE 5 MG: 5 TABLET ORAL at 15:53

## 2025-02-14 RX ADMIN — FENTANYL CITRATE 50 MCG: 50 INJECTION, SOLUTION INTRAMUSCULAR; INTRAVENOUS at 13:20

## 2025-02-14 RX ADMIN — DEXAMETHASONE SODIUM PHOSPHATE 4 MG: 4 INJECTION, SOLUTION INTRAMUSCULAR; INTRAVENOUS at 13:23

## 2025-02-14 RX ADMIN — PROPOFOL 160 MG: 10 INJECTION, EMULSION INTRAVENOUS at 13:13

## 2025-02-14 RX ADMIN — ROCURONIUM BROMIDE 50 MG: 50 INJECTION INTRAVENOUS at 13:13

## 2025-02-14 RX ADMIN — DEXMEDETOMIDINE 10 MCG: 100 INJECTION, SOLUTION, CONCENTRATE INTRAVENOUS at 13:34

## 2025-02-14 RX ADMIN — SODIUM CHLORIDE 2000 MG: 9 INJECTION, SOLUTION INTRAVENOUS at 13:18

## 2025-02-14 RX ADMIN — LIDOCAINE HYDROCHLORIDE 80 MG: 20 INJECTION, SOLUTION INTRAVENOUS at 13:13

## 2025-02-14 RX ADMIN — ONDANSETRON 4 MG: 2 INJECTION INTRAMUSCULAR; INTRAVENOUS at 14:26

## 2025-02-14 RX ADMIN — SUGAMMADEX 200 MG: 100 INJECTION, SOLUTION INTRAVENOUS at 14:34

## 2025-02-14 RX ADMIN — KETOROLAC TROMETHAMINE 30 MG: 30 INJECTION, SOLUTION INTRAMUSCULAR; INTRAVENOUS at 14:34

## 2025-02-14 RX ADMIN — SODIUM CHLORIDE, SODIUM LACTATE, POTASSIUM CHLORIDE, CALCIUM CHLORIDE 20 ML/HR: 20; 30; 600; 310 INJECTION, SOLUTION INTRAVENOUS at 12:57

## 2025-02-14 RX ADMIN — FENTANYL CITRATE 50 MCG: 50 INJECTION, SOLUTION INTRAMUSCULAR; INTRAVENOUS at 13:13

## 2025-02-14 RX ADMIN — ACETAMINOPHEN 1000 MG: 500 TABLET ORAL at 12:57

## 2025-02-14 RX ADMIN — MIDAZOLAM HYDROCHLORIDE 2 MG: 1 INJECTION, SOLUTION INTRAMUSCULAR; INTRAVENOUS at 12:57

## 2025-02-14 RX ADMIN — KETOROLAC TROMETHAMINE 30 MG: 30 INJECTION, SOLUTION INTRAMUSCULAR; INTRAVENOUS at 16:25

## 2025-02-14 NOTE — DISCHARGE INSTRUCTIONS
DISCHARGE INSTRUCTIONS  CYSTOSCOPY STENT PLACEMENT/EXCHANGE      For your surgery you had:  General anesthesia (you may have a sore throat for the first 24 hours)  IV sedation  Local anesthesia  Monitored anesthesia care  You received a medicated path for nausea prevention today (behind your ear). It is recommended that you remove it 24-48 hours post-operatively. It must be removed within 72 hours.   You have received an anesthesia medication today that can cause hormonal forms of birth control to be ineffective. You should use a different form of birth control (to prevent pregnancy) for 7 days.   You may experience dizziness, drowsiness, or lightheadedness for several hours following surgery.  Do not stay alone today or tonight.  Limit your activity for 24 hours.  You should not drive or operate machinery, drink alcohol, or sign legally binding documents for 24 hours or while you are taking pain medication.  Resume your diet slowly.  Follow any special dietary instructions you may have been given by your doctor.     NOTIFY YOUR DOCTOR IF YOU EXPERIENCE ANY OF THE FOLLOWING:  Temperature greater than 101 degrees Fahrenheit  Shaking Chills  Redness or excessive drainage from incision  Nausea, vomiting and/or pain that is not controlled by prescribed medications  Increase in bleeding or bleeding that is excessive  Unable to urinate in 6 hours after surgery  If unable to reach your doctor, please go to the closest Emergency Room  Strain urine if instructed by physician.  Collect any fragments and take with you on your scheduled appointment. You may pass small blood clots.  Blood in your urine is normal.  It could be light pink to cherry color. Urine will be bloody for several days.  Drink 6-8 glasses of fluid each day to assist with flushing kidneys.  Back pain is common.  It may feel like a dull ache or back spasm. Most of the time pain may decrease a little with medication, but will not go away until stent is  removed.  Slight redness or bruising may be noticed on treated side.  If you have difficulty urinating, try sitting in a bathtub of warm water.    If you have a stent, it must be managed by your urologist.  Do NOT forget.  Medications per physician instructions as indicated on After Visit Summary.    Last dose of pain medication was given at:   .    SPECIAL INSTRUCTIONS:

## 2025-02-14 NOTE — ANESTHESIA POSTPROCEDURE EVALUATION
Patient: Isidoro Estrella    Procedure Summary       Date: 02/14/25 Room / Location: [facility] OR  / [facility] MAIN OR    Anesthesia Start: 1307 Anesthesia Stop: 1450    Procedure: CYSTOSCOPY URETEROSCOPY RETROGRADE PYELOGRAM HOLMIUM BASKET STONE EXTRACTION BILATERAL URETERAL LASER STENT INSERTION, bilateral  plain text: scope bladder and ureter, shoot x-ray, treat stone, place stent, bilateral (Bilateral: Ureter) Diagnosis:       Right ureteral stone      Stone in renal pelvis      Nephrolithiasis      (Right ureteral stone [N20.1])      (Stone in renal pelvis [N20.0])      (Nephrolithiasis [N20.0])    Surgeons: Rachele Gunn MD Provider: Elza Lo CRNA    Anesthesia Type: general ASA Status: 2            Anesthesia Type: general    Vitals  Vitals Value Taken Time   /90 02/14/25 1519   Temp 36.1 °C (97 °F) 02/14/25 1517   Pulse 67 02/14/25 1522   Resp 18 02/14/25 1522   SpO2 99 % 02/14/25 1522   Vitals shown include unfiled device data.        Post Anesthesia Care and Evaluation    Patient location during evaluation: bedside  Patient participation: complete - patient participated  Level of consciousness: awake    Airway patency: patent  PONV Status: none  Cardiovascular status: acceptable  Respiratory status: acceptable  Hydration status: acceptable

## 2025-02-14 NOTE — ANESTHESIA PREPROCEDURE EVALUATION
Anesthesia Evaluation     Patient summary reviewed and Nursing notes reviewed   no history of anesthetic complications:   NPO Solid Status: > 8 hours  NPO Liquid Status: > 2 hours           Airway   Mallampati: II  TM distance: >3 FB  Neck ROM: full  No difficulty expected  Dental          Pulmonary - negative pulmonary ROS and normal exam    breath sounds clear to auscultation  Cardiovascular - normal exam  Exercise tolerance: good (4-7 METS)    Rhythm: regular  Rate: normal    (+) hypertension, hyperlipidemia      Neuro/Psych- negative ROS  GI/Hepatic/Renal/Endo    (+) renal disease- stones    Musculoskeletal (-) negative ROS    Abdominal    Substance History - negative use     OB/GYN negative ob/gyn ROS         Other - negative ROS       ROS/Med Hx Other: PAT Nursing Notes unavailable.           Latest Reference Range & Units 02/06/25 14:52   Sodium 136 - 145 mmol/L 142   Potassium 3.5 - 5.2 mmol/L 3.9   Chloride 98 - 107 mmol/L 104   CO2 22.0 - 29.0 mmol/L 26.1   Anion Gap 5.0 - 15.0 mmol/L 11.9   BUN 6 - 20 mg/dL 20   Creatinine 0.76 - 1.27 mg/dL 1.57 (H)   BUN/Creatinine Ratio 7.0 - 25.0  12.7   eGFR >60.0 mL/min/1.73 53.4 (L)   Glucose 65 - 99 mg/dL 95   Calcium 8.6 - 10.5 mg/dL 9.6   (H): Data is abnormally high  (L): Data is abnormally low    12/22/24 23:31  Hemoglobin: 14.5  Hematocrit: 45.7              Anesthesia Plan    ASA 2     general     (Patient understands anesthesia not responsible for dental damage.)  intravenous induction     Anesthetic plan, risks, benefits, and alternatives have been provided, discussed and informed consent has been obtained with: patient.    Use of blood products discussed with patient .    Plan discussed with CRNA.      CODE STATUS:

## 2025-02-14 NOTE — OP NOTE
Preoperative diagnosis  Right ureteral stone, left renal pelvis stone, nephrolithiasis    Postoperative diagnosis  Right renal stone, left renal pelvis stone, nephrolithiasis    Procedure performed  Cystoscopy, bilateral retrograde pyelogram, ureteroscopy, bilateral laser lithotripsy, ureteral stent insertion     Attending surgeon  Rachele Gunn MD     Anesthesia  General    EBL  0 mL    Complications  None    Specimen  Bilateral ureteral and renal Stones    Findings  Cystoscopy without abnormality, bilateral orthotopic ureters  Treatment of right ureteral stone large left renal pelvis stone; no significant stone burden at conclusion of case only sediment dust  6 x 24 stent bilateral ureteral stent no string    Indications  49 y.o. male agreed to undergo the above named procedure after discussion of the alternatives, risks and benefits.   Informed consent was obtained.      Procedure  After informed consent was obtained.  The patient was taken back to the operating suite where general anesthesia was administered.  Once patient was adequately anesthetized he was placed in the dorsal lithotomy position.  His genitals were prepped and draped in the normal sterile fashion.  A rigid cystoscope was inserted gently into the urethral meatus and passed atraumatically into the bladder.  Pan cystoscopy was performed and a 360 degree manner.  No abnormalities were noted.  There were no diverticula, trabeculations, lesions, or tumors.  Patient had bilateral orthotopic ureters.  Focus was placed on the right side.  A sensor wire was attempted to intubate the ureteral orifice but it would not pass.   Next, semirigid ureteroscope was used to navigate the ureteral orifice with assistance of the wire.  The stone was located in the distal ureter.  It was too large to basket.  Laser fiber inserted through the ureteroscope and laser lithotripsy was initiated to fragment the  stone into pieces.  These fragments were then  systematically retrieved until the ureter was clear.  Further ureteroscopy up to the proximal ureter was free of stone.  The flexible ureteroscope was attempted to be passed into the distal ureter but would not  enter the ureter.  Second wire was passed, ureteroscope removed.  Pollack catheter was threaded over the wire the wire was then retrieved and retrograde pyelogram was obtained.  Retrograde pyelogram indicated mild hydronephrosis no filling defect or stricture.  The wire was then reinserted and the Pollack reduced.  A 6 x 24 stent was placed over the wire.  The wire was backloaded through the cystoscope and under direct vision the stent was placed.  Upon retrieval of the wire there was proximal coil within the UPJ and visible distal coil within the bladder.  Patient's bladder was then emptied and the focus for the remainder of the procedure was placed on the left side.      Pollack catheter was then placed in the distal left ureter and wire threaded through it into the renal pelvis.  Second wire was placed, Pollick reduced.  One of the wires was secured to the drape as a safety wire for the remainder of the case.  The  access sheath was passed proximal ureter.  The ureteroscope was then assembled and ureteroscopy  proceeded in a systematic manner until the stone was  encountered.  Once the stone was encountered, laser  lithotripsy was then initiated.  Laser lithotripsy was utilized to dust and  fragment the calculus into pieces.  Any sizable  fragments were then basketed out. After adequate treatment of the stone, nephroscopy then proceeded in a systematic manner.  Contrast dye was injected ensuring thorough inspection of all calyces.  Retrograde pyelogram revealed moderate hydronephrosis.  Once there were no further stone fragments, only sediment and dust remained, ureteroscopy proceeded down the length of the ureter  with retrieval of the access sheath, leaving the safety wire in place. There were no  ureteral calculi  or fragments.   After withdrawing the  ureteroscope, as well as the access sheath, the wire was back loaded through  the cystoscope and 6 x 24 double J stent was placed over the wire under  direct visualization.  Upon retrieval of the wire, there was good proximal  coil noted on x-ray, as well as distal coil within the bladder. At this  point, the procedure was deemed terminated.  The patient's bladder was then emptied and the cystoscope removed.  The patient was then placed back in the supine position and awakened from general  anesthesia and transferred to the PACU in good condition.          Signed:  Rachele Gunn MD  02/14/25  14:51 EST

## 2025-02-15 LAB
QT INTERVAL: 385 MS
QTC INTERVAL: 423 MS

## 2025-02-18 LAB
LAB AP CASE REPORT: NORMAL
LAB AP CLINICAL INFORMATION: NORMAL
PATH REPORT.FINAL DX SPEC: NORMAL
PATH REPORT.GROSS SPEC: NORMAL

## 2025-02-26 ENCOUNTER — PROCEDURE VISIT (OUTPATIENT)
Dept: UROLOGY | Age: 50
End: 2025-02-26
Payer: COMMERCIAL

## 2025-02-26 VITALS — BODY MASS INDEX: 31.82 KG/M2 | WEIGHT: 191 LBS | HEIGHT: 65 IN | RESPIRATION RATE: 18 BRPM

## 2025-02-26 DIAGNOSIS — T19.9XXA FOREIGN BODY IN GENITOURINARY TRACT, INITIAL ENCOUNTER: Primary | ICD-10-CM

## 2025-02-26 DIAGNOSIS — N13.30 HYDRONEPHROSIS, UNSPECIFIED HYDRONEPHROSIS TYPE: ICD-10-CM

## 2025-02-26 NOTE — PROGRESS NOTES
Preoperative diagnosis  Foreign body in genitourinary tract; hydronephrosis    Postoperative diagnosis  Same as above    Procedure  Flexible cystourethroscopy with stent removal    Attending surgeon  Rachele Gunn MD     Anesthesia  2% lidocaine jelly intraurethrally    Complications  None    Specimen  None    Estimated blood loss  0cc    Indications  49 y.o. male who is status post bilateral ureteroscopy with stone treatment who presents for stent removal.    Procedure  The patient was appropriately identified and brought to the cytoscopy suite. A timeout was undertaken documenting the correct patient, site, and procedure. The patient was prepped in a normal sterile fashion. A flexible cytoscope was then introduced into the bladder.  One of the stents was identified and grasped with endoscopic graspers. The entire stent was removed and passed off the field.   The cystoscope was then reinserted, the other stent was visualized, grasped and removed entirely.  Patient tolerated the procedure well. There were no intraprocedural complications.     Plan  Tolerated procedure well.  Instructions provided.  Patient follow-up in 4 to 6 weeks with renal ultrasound prior or earlier as needed  All question addressed

## 2025-02-27 LAB
CALCIUM OXALATE DIHYDRATE MFR STONE IR: 90 %
COLOR STONE: NORMAL
COM MFR STONE: 5 %
COMPN STONE: NORMAL
HYDROXYAPATITE: 5 %
LABORATORY COMMENT REPORT: NORMAL
Lab: NORMAL
Lab: NORMAL
PHOTO: NORMAL
SIZE STONE: NORMAL MM
SPEC SOURCE SUBJ: NORMAL
WT STONE: 189 MG

## 2025-03-05 DIAGNOSIS — N20.1 RIGHT URETERAL STONE: ICD-10-CM

## 2025-03-05 RX ORDER — TAMSULOSIN HYDROCHLORIDE 0.4 MG/1
1 CAPSULE ORAL DAILY
Qty: 30 CAPSULE | OUTPATIENT
Start: 2025-03-05

## 2025-03-11 ENCOUNTER — HOSPITAL ENCOUNTER (OUTPATIENT)
Dept: ULTRASOUND IMAGING | Facility: HOSPITAL | Age: 50
Discharge: HOME | End: 2025-03-11
Admitting: UROLOGY
Payer: COMMERCIAL

## 2025-03-11 DIAGNOSIS — N13.30 HYDRONEPHROSIS, UNSPECIFIED HYDRONEPHROSIS TYPE: ICD-10-CM

## 2025-03-11 PROCEDURE — 76775 US EXAM ABDO BACK WALL LIM: CPT

## 2025-04-10 ENCOUNTER — OFFICE VISIT (OUTPATIENT)
Dept: UROLOGY | Age: 50
End: 2025-04-10
Payer: COMMERCIAL

## 2025-04-10 DIAGNOSIS — N20.0 CALCIUM OXALATE KIDNEY STONES: Primary | ICD-10-CM

## 2025-04-10 DIAGNOSIS — N13.30 HYDRONEPHROSIS, UNSPECIFIED HYDRONEPHROSIS TYPE: ICD-10-CM

## 2025-04-10 NOTE — PROGRESS NOTES
UROLOGY OFFICE FOLLOW UP NOTE    Subjective   HPI  Isidoro Estrella is a 49 y.o. male.  Presents for follow-up status post bilateral ureteroscopy, stone treatment, 2/14/2025.  Patient subsequently underwent stent removal in office and presents with renal ultrasound prior to today's appointment.  Patient states that he has been doing well since the procedure.    Currently denies any urinary symptoms.    Denies hematuria or flank pain.  No complaints today.    History of Present Illness  His fluid intake includes Gatorade, occasional cranberry juice, and a daily 16-ounce bottle of Dr. Pepper, although he acknowledges that his water consumption could be increased. He has made efforts to reduce his intake of soft drinks.     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 His work environment is not air-conditioned, and he is required to wear a rain jacket, which results in significant perspiration.    ____________  Bilateral URS stone treatment 2/14/2025  Analysis 2/14/2025: 85% calcium oxalate    Renal ultrasound 3/11/2025:  Mild left-sided hydronephrosis   2. Nonobstructing calculus left kidney   3. Right-sided hydronephrosis has resolved   4. Bladder is limited due to incomplete distention. Left ureteral jet is seen.  ________  KUB 2/4/2025: Pending; reviewed by me; left nephrolithiasis noted; no obvious right ureteral stone     CT abdomen pelvis without contrast 12/23/2024:.Mild right hydronephrosis secondary to a 6 mm stone in the proximal right ureter.  2.Bilateral nonobstructing kidney stones. There is a large calcification in the left renal pelvis which may be causing some minimal dilatation of the left renal pelvis. This is new since the previous study.  3.Hepatic steatosis.  4.Small fat-containing umbilical hernia.     Labs 12/22/2024:  Creatinine: 1.39 (baseline around 1)  WBC 13.37  UA TNTC RBCs; 21-50 WBCs; no bacteria; small leukocyte esterase, nitrite  "negative    Results for orders placed or performed during the hospital encounter of 02/14/25   ECG 12 Lead Pre-Op / Pre-Procedure    Collection Time: 02/14/25 12:14 PM   Result Value Ref Range    QT Interval 385 ms    QTC Interval 423 ms   STONE ANALYSIS - Calculus, Ureter, Right    Collection Time: 02/14/25  2:26 PM    Specimen: Ureter, Right; Calculus   Result Value Ref Range    Stone Source Comment     Color Tan     Size 6x3 mm    Stone Weight 189 mg    Composition Comment     Ca Oxalate - Monohydrate, Stone 5 %    Ca Oxalate-Dihydrate, Stone 90 %    HYDROXYAPATITE 5 %    Photo Comment     Comments: Comment     Please note Comment     Disclaimer: Comment    Tissue Pathology Exam    Collection Time: 02/14/25  2:26 PM    Specimen: Ureter, Right; Calculus   Result Value Ref Range    Case Report       Surgical Pathology Report                         Case: [id number]                                  Authorizing Provider:  Rachele Gunn MD      Collected:           02/14/2025 02:26 PM          Ordering Location:     [facility] Received:            02/17/2025 07:37 AM                                 OR                                                                           Pathologist:           Diana Shaikh DO                                                       Specimen:    Ureter, Right, RIGHT URETERAL AND LEFT RENAL STONES                                        Clinical Information       Right ureteral stone  Stone in renal pelvis  Nephrolithiasis      Final Diagnosis       Right ureteral and left renal stones, removal:   - Stone fragments, sent for chemical analysis (gross only diagnosis)         Gross Description       1. Ureter, Right.  Received fresh and labeled \" right ureteral and left renal stones\" is a 1.0 x 0.7 x 0.7 cm aggregate of brown, friable renal stones.  The specimen is submitted en toto for chemical analysis following gross examination by staff pathologist Diana " Neel. PERLA           Review of systems  A review of systems was performed, and positive findings are noted in the HPI.    Objective     Vital Signs:   There were no vitals taken for this visit.      Physical exam  No acute distress, well-nourished  Awake alert and oriented  Mood normal; affect normal  Physical Exam      Problem List:  Patient Active Problem List   Diagnosis    Obesity (BMI 30.0-34.9)    Hypercalciuria    Screening for malignant neoplasm of colon    History of colonic polyps    Mixed hyperlipidemia    Prediabetes    Essential hypertension    Right ureteral stone    Stone in renal pelvis    Nephrolithiasis       Assessment & Plan   Diagnoses and all orders for this visit:    1. Calcium oxalate kidney stones (Primary)    2. Hydronephrosis, unspecified hydronephrosis type  -     US Renal Bilateral; Future      Renal ultrasound imaging reviewed and discussed with patient at length, no right hydronephrosis, mild left hydronephrosis of unknown significance.  Discussed that this was the side that had significant stone burden, persistent dilation may be secondary to chronic dilation given how long the stone had been in there.  Also noted a suspected residual stone within the lower pole; discussed that this may be sediment settling, or residual fragments.      Would not recommend additional treatment for this suspected stone but would recommend repeating renal ultrasound in 4 months to assess for progression, resolution, or stability.  He is agreeable to this.     Should he develop suspected ureteral colic, flank pain, will warrant additional imaging via CT scan.      Calcium oxalate stone- Discussed dietary modifications for prevention of stone growth and recurrence including increased hydration, decrease sodium, normal calcium, low animal protein diet.      Informational handouts provided    Follow-up 4 months, renal ultrasound prior  All questions addressed        Assessment & Plan             Patient or  patient representative verbalized consent for the use of Ambient Listening during the visit with  Rachele Gunn MD for chart documentation. 4/12/2025  12:06 EDT

## 2025-08-19 ENCOUNTER — TELEPHONE (OUTPATIENT)
Dept: UROLOGY | Age: 50
End: 2025-08-19
Payer: COMMERCIAL

## (undated) DEVICE — PAD GRND E/S W/CORD SPLT A/

## (undated) DEVICE — SOL IRR H2O BTL 1000ML STRL

## (undated) DEVICE — TUBING, SUCTION, 1/4" X 10', STRAIGHT: Brand: MEDLINE

## (undated) DEVICE — URETERAL ACCESS SHEATH SET: Brand: NAVIGATOR HD

## (undated) DEVICE — SOL IRRG H2O PL/BG 1000ML STRL

## (undated) DEVICE — NITINOL STONE RETRIEVAL BASKET: Brand: ESCAPE

## (undated) DEVICE — STERILE POLYISOPRENE POWDER-FREE SURGICAL GLOVES: Brand: PROTEXIS

## (undated) DEVICE — COLON KIT: Brand: MEDLINE INDUSTRIES, INC.

## (undated) DEVICE — CATH URETRL FLXITP POLLACK STD 5F 70CM

## (undated) DEVICE — THE SINGLE USE ETRAP – POLYP TRAP IS USED FOR SUCTION RETRIEVAL OF ENDOSCOPICALLY REMOVED POLYPS.: Brand: ETRAP

## (undated) DEVICE — GW ULTRATRACK HYBRID STR/TP .035IN 3X150CM EA/5

## (undated) DEVICE — TOWEL,OR,DSP,ST,BLUE,STD,4/PK,20PK/CS: Brand: MEDLINE

## (undated) DEVICE — CYSTO PACK: Brand: MEDLINE INDUSTRIES, INC.

## (undated) DEVICE — Device: Brand: DEFENDO AIR/WATER/SUCTION AND BIOPSY VALVE

## (undated) DEVICE — SOLIDIFIER LIQLOC PLS 1500CC BT

## (undated) DEVICE — Device

## (undated) DEVICE — GLV SURG SENSICARE PI LF PF 7.5 GRN STRL

## (undated) DEVICE — SYS IRR PUMP SGL ACTN VAC SYR 10CC

## (undated) DEVICE — SET, IRRIGATION CYSTO, Y-TYPE, 81": Brand: MEDLINE

## (undated) DEVICE — CATH 2L URETRL HC 6F 50CM

## (undated) DEVICE — SKIN PREP TRAY W/CHG: Brand: MEDLINE INDUSTRIES, INC.

## (undated) DEVICE — SOL IRR NACL 0.9PCT 3000ML

## (undated) DEVICE — ENDOSCOPIC VALVE WITH ADAPTER.: Brand: SURSEAL® II

## (undated) DEVICE — FIBR LASR MOSES 200 DFL 2J 80HZ

## (undated) DEVICE — STERILE POLYISOPRENE POWDER-FREE SURGICAL GLOVES WITH EMOLLIENT COATING: Brand: PROTEXIS

## (undated) DEVICE — SNAR POLYP CAPTIFLEX XS/OVL 11X2.4MM 240CM 1P/U

## (undated) DEVICE — LINER SURG CANSTR SXN S/RIGD 1500CC

## (undated) DEVICE — CONN JET HYDRA H20 AUXILIARY DISP

## (undated) DEVICE — THE STERILE LIGHT HANDLE COVER IS USED WITH STERIS SURGICAL LIGHTING AND VISUALIZATION SYSTEMS.

## (undated) DEVICE — BASIC SINGLE BASIN-LF: Brand: MEDLINE INDUSTRIES, INC.

## (undated) DEVICE — GLOVE,SURG,SENSICARE SLT,LF,PF,7: Brand: MEDLINE